# Patient Record
Sex: MALE | Race: WHITE | Employment: OTHER | URBAN - NONMETROPOLITAN AREA
[De-identification: names, ages, dates, MRNs, and addresses within clinical notes are randomized per-mention and may not be internally consistent; named-entity substitution may affect disease eponyms.]

---

## 2017-01-06 LAB — PROSTATE SPECIFIC ANTIGEN: 3.29 NG/ML

## 2017-01-09 ENCOUNTER — OFFICE VISIT (OUTPATIENT)
Dept: UROLOGY | Age: 62
End: 2017-01-09

## 2017-01-09 VITALS
WEIGHT: 185.8 LBS | BODY MASS INDEX: 28.16 KG/M2 | SYSTOLIC BLOOD PRESSURE: 112 MMHG | DIASTOLIC BLOOD PRESSURE: 72 MMHG | HEIGHT: 68 IN

## 2017-01-09 DIAGNOSIS — R97.20 ELEVATED PSA: Primary | ICD-10-CM

## 2017-01-09 DIAGNOSIS — N40.1 BENIGN NON-NODULAR PROSTATIC HYPERPLASIA WITH LOWER URINARY TRACT SYMPTOMS: ICD-10-CM

## 2017-01-09 LAB
BILIRUBIN, POC: NORMAL
BLOOD URINE, POC: NORMAL
CLARITY, POC: CLEAR
COLOR, POC: NORMAL
GLUCOSE URINE, POC: NORMAL
KETONES, POC: NORMAL
LEUKOCYTE EST, POC: NORMAL
NITRITE, POC: NORMAL
PH, POC: 6.5
PROTEIN, POC: NORMAL
SPECIFIC GRAVITY, POC: <=1.005
UROBILINOGEN, POC: NORMAL

## 2017-01-09 PROCEDURE — 99214 OFFICE O/P EST MOD 30 MIN: CPT | Performed by: UROLOGY

## 2017-01-09 PROCEDURE — 81003 URINALYSIS AUTO W/O SCOPE: CPT | Performed by: UROLOGY

## 2017-01-09 RX ORDER — MECLIZINE HYDROCHLORIDE 25 MG/1
25 TABLET ORAL 3 TIMES DAILY PRN
COMMUNITY

## 2017-01-09 RX ORDER — ACETAMINOPHEN 160 MG
2000 TABLET,DISINTEGRATING ORAL
COMMUNITY

## 2017-01-09 RX ORDER — TAMSULOSIN HYDROCHLORIDE 0.4 MG/1
CAPSULE ORAL
Qty: 90 CAPSULE | Refills: 3 | Status: SHIPPED | OUTPATIENT
Start: 2017-01-09

## 2017-01-09 ASSESSMENT — ENCOUNTER SYMPTOMS
SHORTNESS OF BREATH: 0
BACK PAIN: 0
ABDOMINAL PAIN: 0
EYE REDNESS: 0
NAUSEA: 0
CHEST TIGHTNESS: 0
EYE PAIN: 0
FACIAL SWELLING: 0

## 2021-05-26 ENCOUNTER — TELEPHONE (OUTPATIENT)
Dept: ONCOLOGY | Age: 66
End: 2021-05-26

## 2023-09-30 ENCOUNTER — APPOINTMENT (OUTPATIENT)
Dept: GENERAL RADIOLOGY | Age: 68
End: 2023-09-30
Payer: MEDICARE

## 2023-09-30 ENCOUNTER — HOSPITAL ENCOUNTER (INPATIENT)
Age: 68
LOS: 6 days | Discharge: HOME | End: 2023-10-06
Attending: FAMILY MEDICINE | Admitting: SURGERY
Payer: MEDICARE

## 2023-09-30 ENCOUNTER — ANESTHESIA (OUTPATIENT)
Dept: OPERATING ROOM | Age: 68
End: 2023-09-30
Payer: MEDICARE

## 2023-09-30 ENCOUNTER — ANESTHESIA EVENT (OUTPATIENT)
Dept: OPERATING ROOM | Age: 68
End: 2023-09-30
Payer: MEDICARE

## 2023-09-30 ENCOUNTER — APPOINTMENT (OUTPATIENT)
Dept: CT IMAGING | Age: 68
End: 2023-09-30
Payer: MEDICARE

## 2023-09-30 DIAGNOSIS — N17.9 AKI (ACUTE KIDNEY INJURY) (HCC): ICD-10-CM

## 2023-09-30 DIAGNOSIS — Z98.890 S/P EXPLORATORY LAPAROTOMY: ICD-10-CM

## 2023-09-30 DIAGNOSIS — E86.0 DEHYDRATION: ICD-10-CM

## 2023-09-30 DIAGNOSIS — K56.609 SBO (SMALL BOWEL OBSTRUCTION) (HCC): Primary | ICD-10-CM

## 2023-09-30 LAB
ALBUMIN SERPL BCG-MCNC: 4.8 G/DL (ref 3.5–5.1)
ALP SERPL-CCNC: 127 U/L (ref 38–126)
ALT SERPL W/O P-5'-P-CCNC: 9 U/L (ref 11–66)
ANION GAP SERPL CALC-SCNC: 17 MEQ/L (ref 8–16)
AST SERPL-CCNC: 23 U/L (ref 5–40)
BILIRUB SERPL-MCNC: 1.4 MG/DL (ref 0.3–1.2)
BUN SERPL-MCNC: 43 MG/DL (ref 7–22)
CALCIUM SERPL-MCNC: 10.6 MG/DL (ref 8.5–10.5)
CHLORIDE SERPL-SCNC: 87 MEQ/L (ref 98–111)
CO2 SERPL-SCNC: 35 MEQ/L (ref 23–33)
CREAT SERPL-MCNC: 1.5 MG/DL (ref 0.4–1.2)
GFR SERPL CREATININE-BSD FRML MDRD: 50 ML/MIN/1.73M2
GLUCOSE SERPL-MCNC: 144 MG/DL (ref 70–108)
LIPASE SERPL-CCNC: 22.6 U/L (ref 5.6–51.3)
OSMOLALITY SERPL CALC.SUM OF ELEC: 290.9 MOSMOL/KG (ref 275–300)
POTASSIUM SERPL-SCNC: 3.9 MEQ/L (ref 3.5–5.2)
PROT SERPL-MCNC: 7.9 G/DL (ref 6.1–8)
SCAN OF BLOOD SMEAR: NORMAL
SODIUM SERPL-SCNC: 139 MEQ/L (ref 135–145)

## 2023-09-30 PROCEDURE — 99222 1ST HOSP IP/OBS MODERATE 55: CPT | Performed by: SURGERY

## 2023-09-30 PROCEDURE — 6360000002 HC RX W HCPCS: Performed by: REGISTERED NURSE

## 2023-09-30 PROCEDURE — 74018 RADEX ABDOMEN 1 VIEW: CPT

## 2023-09-30 PROCEDURE — 6360000002 HC RX W HCPCS: Performed by: SURGERY

## 2023-09-30 PROCEDURE — 96374 THER/PROPH/DIAG INJ IV PUSH: CPT

## 2023-09-30 PROCEDURE — 44050 REDUCE BOWEL OBSTRUCTION: CPT | Performed by: SURGERY

## 2023-09-30 PROCEDURE — 7100000001 HC PACU RECOVERY - ADDTL 15 MIN: Performed by: SURGERY

## 2023-09-30 PROCEDURE — 74177 CT ABD & PELVIS W/CONTRAST: CPT

## 2023-09-30 PROCEDURE — 6360000004 HC RX CONTRAST MEDICATION: Performed by: STUDENT IN AN ORGANIZED HEALTH CARE EDUCATION/TRAINING PROGRAM

## 2023-09-30 PROCEDURE — 1200000000 HC SEMI PRIVATE

## 2023-09-30 PROCEDURE — 99285 EMERGENCY DEPT VISIT HI MDM: CPT

## 2023-09-30 PROCEDURE — 83690 ASSAY OF LIPASE: CPT

## 2023-09-30 PROCEDURE — 0DN80ZZ RELEASE SMALL INTESTINE, OPEN APPROACH: ICD-10-PCS | Performed by: SURGERY

## 2023-09-30 PROCEDURE — 6360000002 HC RX W HCPCS

## 2023-09-30 PROCEDURE — 6360000002 HC RX W HCPCS: Performed by: STUDENT IN AN ORGANIZED HEALTH CARE EDUCATION/TRAINING PROGRAM

## 2023-09-30 PROCEDURE — 0DQV0ZZ REPAIR MESENTERY, OPEN APPROACH: ICD-10-PCS | Performed by: SURGERY

## 2023-09-30 PROCEDURE — 96361 HYDRATE IV INFUSION ADD-ON: CPT

## 2023-09-30 PROCEDURE — 3600000013 HC SURGERY LEVEL 3 ADDTL 15MIN: Performed by: SURGERY

## 2023-09-30 PROCEDURE — 3700000001 HC ADD 15 MINUTES (ANESTHESIA): Performed by: SURGERY

## 2023-09-30 PROCEDURE — 2500000003 HC RX 250 WO HCPCS: Performed by: SURGERY

## 2023-09-30 PROCEDURE — 6370000000 HC RX 637 (ALT 250 FOR IP): Performed by: SURGERY

## 2023-09-30 PROCEDURE — 3600000003 HC SURGERY LEVEL 3 BASE: Performed by: SURGERY

## 2023-09-30 PROCEDURE — 2709999900 HC NON-CHARGEABLE SUPPLY: Performed by: SURGERY

## 2023-09-30 PROCEDURE — 2580000003 HC RX 258: Performed by: REGISTERED NURSE

## 2023-09-30 PROCEDURE — 0D9670Z DRAINAGE OF STOMACH WITH DRAINAGE DEVICE, VIA NATURAL OR ARTIFICIAL OPENING: ICD-10-PCS | Performed by: FAMILY MEDICINE

## 2023-09-30 PROCEDURE — 36415 COLL VENOUS BLD VENIPUNCTURE: CPT

## 2023-09-30 PROCEDURE — 85025 COMPLETE CBC W/AUTO DIFF WBC: CPT

## 2023-09-30 PROCEDURE — 3700000000 HC ANESTHESIA ATTENDED CARE: Performed by: SURGERY

## 2023-09-30 PROCEDURE — 7100000000 HC PACU RECOVERY - FIRST 15 MIN: Performed by: SURGERY

## 2023-09-30 PROCEDURE — 2500000003 HC RX 250 WO HCPCS: Performed by: REGISTERED NURSE

## 2023-09-30 PROCEDURE — 80053 COMPREHEN METABOLIC PANEL: CPT

## 2023-09-30 PROCEDURE — 6360000002 HC RX W HCPCS: Performed by: ANESTHESIOLOGY

## 2023-09-30 PROCEDURE — 2580000003 HC RX 258: Performed by: STUDENT IN AN ORGANIZED HEALTH CARE EDUCATION/TRAINING PROGRAM

## 2023-09-30 RX ORDER — LIDOCAINE HCL/PF 100 MG/5ML
SYRINGE (ML) INJECTION PRN
Status: DISCONTINUED | OUTPATIENT
Start: 2023-09-30 | End: 2023-09-30 | Stop reason: SDUPTHER

## 2023-09-30 RX ORDER — METOCLOPRAMIDE HYDROCHLORIDE 5 MG/ML
10 INJECTION INTRAMUSCULAR; INTRAVENOUS
Status: ACTIVE | OUTPATIENT
Start: 2023-09-30 | End: 2023-10-01

## 2023-09-30 RX ORDER — SODIUM CHLORIDE 9 MG/ML
INJECTION, SOLUTION INTRAVENOUS PRN
Status: DISCONTINUED | OUTPATIENT
Start: 2023-09-30 | End: 2023-10-06 | Stop reason: HOSPADM

## 2023-09-30 RX ORDER — ONDANSETRON 2 MG/ML
4 INJECTION INTRAMUSCULAR; INTRAVENOUS EVERY 6 HOURS PRN
Status: DISCONTINUED | OUTPATIENT
Start: 2023-09-30 | End: 2023-10-06 | Stop reason: HOSPADM

## 2023-09-30 RX ORDER — FENTANYL CITRATE 50 UG/ML
25 INJECTION, SOLUTION INTRAMUSCULAR; INTRAVENOUS EVERY 5 MIN PRN
Status: DISCONTINUED | OUTPATIENT
Start: 2023-09-30 | End: 2023-10-02

## 2023-09-30 RX ORDER — ACETAMINOPHEN 325 MG/1
650 TABLET ORAL EVERY 6 HOURS PRN
Status: DISCONTINUED | OUTPATIENT
Start: 2023-09-30 | End: 2023-10-06 | Stop reason: HOSPADM

## 2023-09-30 RX ORDER — SODIUM CHLORIDE 0.9 % (FLUSH) 0.9 %
5-40 SYRINGE (ML) INJECTION EVERY 12 HOURS SCHEDULED
Status: DISCONTINUED | OUTPATIENT
Start: 2023-09-30 | End: 2023-10-02

## 2023-09-30 RX ORDER — LANOLIN ALCOHOL/MO/W.PET/CERES
20 CREAM (GRAM) TOPICAL NIGHTLY
COMMUNITY

## 2023-09-30 RX ORDER — DEXTROSE MONOHYDRATE, SODIUM CHLORIDE, AND POTASSIUM CHLORIDE 50; 1.49; 4.5 G/1000ML; G/1000ML; G/1000ML
INJECTION, SOLUTION INTRAVENOUS CONTINUOUS
Status: DISCONTINUED | OUTPATIENT
Start: 2023-09-30 | End: 2023-10-06

## 2023-09-30 RX ORDER — MORPHINE SULFATE 4 MG/ML
4 INJECTION, SOLUTION INTRAMUSCULAR; INTRAVENOUS
Status: DISCONTINUED | OUTPATIENT
Start: 2023-09-30 | End: 2023-10-06 | Stop reason: HOSPADM

## 2023-09-30 RX ORDER — ROCURONIUM BROMIDE 10 MG/ML
INJECTION, SOLUTION INTRAVENOUS PRN
Status: DISCONTINUED | OUTPATIENT
Start: 2023-09-30 | End: 2023-09-30 | Stop reason: SDUPTHER

## 2023-09-30 RX ORDER — SUCCINYLCHOLINE/SOD CL,ISO/PF 200MG/10ML
SYRINGE (ML) INTRAVENOUS PRN
Status: DISCONTINUED | OUTPATIENT
Start: 2023-09-30 | End: 2023-09-30 | Stop reason: SDUPTHER

## 2023-09-30 RX ORDER — 0.9 % SODIUM CHLORIDE 0.9 %
1000 INTRAVENOUS SOLUTION INTRAVENOUS ONCE
Status: COMPLETED | OUTPATIENT
Start: 2023-09-30 | End: 2023-09-30

## 2023-09-30 RX ORDER — FENTANYL CITRATE 50 UG/ML
INJECTION, SOLUTION INTRAMUSCULAR; INTRAVENOUS PRN
Status: DISCONTINUED | OUTPATIENT
Start: 2023-09-30 | End: 2023-09-30 | Stop reason: SDUPTHER

## 2023-09-30 RX ORDER — ONDANSETRON 4 MG/1
4 TABLET, ORALLY DISINTEGRATING ORAL EVERY 8 HOURS PRN
Status: DISCONTINUED | OUTPATIENT
Start: 2023-09-30 | End: 2023-10-06 | Stop reason: HOSPADM

## 2023-09-30 RX ORDER — MIDAZOLAM HYDROCHLORIDE 1 MG/ML
1 INJECTION INTRAMUSCULAR; INTRAVENOUS ONCE
Status: DISCONTINUED | OUTPATIENT
Start: 2023-09-30 | End: 2023-10-06 | Stop reason: HOSPADM

## 2023-09-30 RX ORDER — HYDRALAZINE HYDROCHLORIDE 20 MG/ML
INJECTION INTRAMUSCULAR; INTRAVENOUS
Status: COMPLETED
Start: 2023-09-30 | End: 2023-09-30

## 2023-09-30 RX ORDER — SODIUM CHLORIDE 0.9 % (FLUSH) 0.9 %
5-40 SYRINGE (ML) INJECTION PRN
Status: DISCONTINUED | OUTPATIENT
Start: 2023-09-30 | End: 2023-10-06 | Stop reason: HOSPADM

## 2023-09-30 RX ORDER — FENTANYL CITRATE 50 UG/ML
50 INJECTION, SOLUTION INTRAMUSCULAR; INTRAVENOUS EVERY 5 MIN PRN
Status: DISCONTINUED | OUTPATIENT
Start: 2023-09-30 | End: 2023-10-02

## 2023-09-30 RX ORDER — SODIUM CHLORIDE 0.9 % (FLUSH) 0.9 %
5-40 SYRINGE (ML) INJECTION PRN
Status: DISCONTINUED | OUTPATIENT
Start: 2023-09-30 | End: 2023-10-02

## 2023-09-30 RX ORDER — PHENYLEPHRINE HCL IN 0.9% NACL 1 MG/10 ML
SYRINGE (ML) INTRAVENOUS PRN
Status: DISCONTINUED | OUTPATIENT
Start: 2023-09-30 | End: 2023-09-30 | Stop reason: SDUPTHER

## 2023-09-30 RX ORDER — MORPHINE SULFATE 2 MG/ML
2 INJECTION, SOLUTION INTRAMUSCULAR; INTRAVENOUS
Status: DISCONTINUED | OUTPATIENT
Start: 2023-09-30 | End: 2023-10-06 | Stop reason: HOSPADM

## 2023-09-30 RX ORDER — ACETAMINOPHEN 650 MG/1
650 SUPPOSITORY RECTAL EVERY 6 HOURS PRN
Status: DISCONTINUED | OUTPATIENT
Start: 2023-09-30 | End: 2023-10-06 | Stop reason: HOSPADM

## 2023-09-30 RX ORDER — CEFAZOLIN SODIUM 1 G/3ML
INJECTION, POWDER, FOR SOLUTION INTRAMUSCULAR; INTRAVENOUS PRN
Status: DISCONTINUED | OUTPATIENT
Start: 2023-09-30 | End: 2023-09-30 | Stop reason: SDUPTHER

## 2023-09-30 RX ORDER — TAMSULOSIN HYDROCHLORIDE 0.4 MG/1
0.4 CAPSULE ORAL DAILY
Status: DISCONTINUED | OUTPATIENT
Start: 2023-09-30 | End: 2023-10-06 | Stop reason: HOSPADM

## 2023-09-30 RX ORDER — CHOLECALCIFEROL (VITAMIN D3) 125 MCG
20 CAPSULE ORAL NIGHTLY
Status: DISCONTINUED | OUTPATIENT
Start: 2023-09-30 | End: 2023-09-30

## 2023-09-30 RX ORDER — ONDANSETRON 2 MG/ML
INJECTION INTRAMUSCULAR; INTRAVENOUS PRN
Status: DISCONTINUED | OUTPATIENT
Start: 2023-09-30 | End: 2023-09-30 | Stop reason: SDUPTHER

## 2023-09-30 RX ORDER — ONDANSETRON 2 MG/ML
4 INJECTION INTRAMUSCULAR; INTRAVENOUS ONCE
Status: COMPLETED | OUTPATIENT
Start: 2023-09-30 | End: 2023-09-30

## 2023-09-30 RX ORDER — PROPOFOL 10 MG/ML
INJECTION, EMULSION INTRAVENOUS PRN
Status: DISCONTINUED | OUTPATIENT
Start: 2023-09-30 | End: 2023-09-30 | Stop reason: SDUPTHER

## 2023-09-30 RX ORDER — LANOLIN ALCOHOL/MO/W.PET/CERES
18 CREAM (GRAM) TOPICAL NIGHTLY
Status: DISCONTINUED | OUTPATIENT
Start: 2023-09-30 | End: 2023-10-06 | Stop reason: HOSPADM

## 2023-09-30 RX ORDER — ENOXAPARIN SODIUM 100 MG/ML
40 INJECTION SUBCUTANEOUS EVERY 24 HOURS
Status: DISCONTINUED | OUTPATIENT
Start: 2023-09-30 | End: 2023-10-06 | Stop reason: HOSPADM

## 2023-09-30 RX ORDER — HYDRALAZINE HYDROCHLORIDE 20 MG/ML
10 INJECTION INTRAMUSCULAR; INTRAVENOUS
Status: DISCONTINUED | OUTPATIENT
Start: 2023-09-30 | End: 2023-10-06 | Stop reason: HOSPADM

## 2023-09-30 RX ORDER — SODIUM CHLORIDE 0.9 % (FLUSH) 0.9 %
5-40 SYRINGE (ML) INJECTION EVERY 12 HOURS SCHEDULED
Status: DISCONTINUED | OUTPATIENT
Start: 2023-09-30 | End: 2023-10-06 | Stop reason: HOSPADM

## 2023-09-30 RX ORDER — SODIUM CHLORIDE 9 MG/ML
INJECTION, SOLUTION INTRAVENOUS CONTINUOUS PRN
Status: DISCONTINUED | OUTPATIENT
Start: 2023-09-30 | End: 2023-09-30 | Stop reason: SDUPTHER

## 2023-09-30 RX ORDER — DIPHENHYDRAMINE HYDROCHLORIDE 50 MG/ML
12.5 INJECTION INTRAMUSCULAR; INTRAVENOUS
Status: ACTIVE | OUTPATIENT
Start: 2023-09-30 | End: 2023-10-01

## 2023-09-30 RX ORDER — MEPERIDINE HYDROCHLORIDE 25 MG/ML
12.5 INJECTION INTRAMUSCULAR; INTRAVENOUS; SUBCUTANEOUS EVERY 4 HOURS PRN
Status: DISCONTINUED | OUTPATIENT
Start: 2023-09-30 | End: 2023-10-02

## 2023-09-30 RX ORDER — DEXAMETHASONE SODIUM PHOSPHATE 4 MG/ML
INJECTION, SOLUTION INTRA-ARTICULAR; INTRALESIONAL; INTRAMUSCULAR; INTRAVENOUS; SOFT TISSUE PRN
Status: DISCONTINUED | OUTPATIENT
Start: 2023-09-30 | End: 2023-09-30 | Stop reason: SDUPTHER

## 2023-09-30 RX ADMIN — Medication 120 MG: at 14:13

## 2023-09-30 RX ADMIN — ENOXAPARIN SODIUM 40 MG: 100 INJECTION SUBCUTANEOUS at 21:20

## 2023-09-30 RX ADMIN — SODIUM CHLORIDE: 9 INJECTION, SOLUTION INTRAVENOUS at 14:08

## 2023-09-30 RX ADMIN — ROCURONIUM BROMIDE 45 MG: 10 INJECTION INTRAVENOUS at 14:20

## 2023-09-30 RX ADMIN — MORPHINE SULFATE 4 MG: 4 INJECTION, SOLUTION INTRAMUSCULAR; INTRAVENOUS at 21:03

## 2023-09-30 RX ADMIN — ONDANSETRON 4 MG: 2 INJECTION INTRAMUSCULAR; INTRAVENOUS at 11:21

## 2023-09-30 RX ADMIN — HYDRALAZINE HYDROCHLORIDE 10 MG: 20 INJECTION, SOLUTION INTRAMUSCULAR; INTRAVENOUS at 15:39

## 2023-09-30 RX ADMIN — MORPHINE SULFATE 2 MG: 2 INJECTION, SOLUTION INTRAMUSCULAR; INTRAVENOUS at 18:43

## 2023-09-30 RX ADMIN — MORPHINE SULFATE 4 MG: 4 INJECTION, SOLUTION INTRAMUSCULAR; INTRAVENOUS at 16:42

## 2023-09-30 RX ADMIN — CEFAZOLIN 2 G: 1 INJECTION, POWDER, FOR SOLUTION INTRAMUSCULAR; INTRAVENOUS at 14:10

## 2023-09-30 RX ADMIN — HYDROMORPHONE HYDROCHLORIDE 0.5 MG: 1 INJECTION, SOLUTION INTRAMUSCULAR; INTRAVENOUS; SUBCUTANEOUS at 15:54

## 2023-09-30 RX ADMIN — Medication 200 MCG: at 14:24

## 2023-09-30 RX ADMIN — POTASSIUM CHLORIDE, DEXTROSE MONOHYDRATE AND SODIUM CHLORIDE: 150; 5; 450 INJECTION, SOLUTION INTRAVENOUS at 17:08

## 2023-09-30 RX ADMIN — SODIUM CHLORIDE 1000 ML: 9 INJECTION, SOLUTION INTRAVENOUS at 11:20

## 2023-09-30 RX ADMIN — SODIUM CHLORIDE: 9 INJECTION, SOLUTION INTRAVENOUS at 14:53

## 2023-09-30 RX ADMIN — CARBIDOPA AND LEVODOPA 3 TABLET: 25; 100 TABLET ORAL at 21:18

## 2023-09-30 RX ADMIN — HYDROMORPHONE HYDROCHLORIDE 0.5 MG: 1 INJECTION, SOLUTION INTRAMUSCULAR; INTRAVENOUS; SUBCUTANEOUS at 15:49

## 2023-09-30 RX ADMIN — Medication 100 MCG: at 14:22

## 2023-09-30 RX ADMIN — HYDRALAZINE HYDROCHLORIDE 10 MG: 20 INJECTION INTRAMUSCULAR; INTRAVENOUS at 15:39

## 2023-09-30 RX ADMIN — FENTANYL CITRATE 50 MCG: 50 INJECTION, SOLUTION INTRAMUSCULAR; INTRAVENOUS at 15:41

## 2023-09-30 RX ADMIN — SUGAMMADEX 200 MG: 100 INJECTION, SOLUTION INTRAVENOUS at 15:18

## 2023-09-30 RX ADMIN — IOPAMIDOL 80 ML: 755 INJECTION, SOLUTION INTRAVENOUS at 11:37

## 2023-09-30 RX ADMIN — FENTANYL CITRATE 50 MCG: 50 INJECTION, SOLUTION INTRAMUSCULAR; INTRAVENOUS at 15:27

## 2023-09-30 RX ADMIN — PROPOFOL 150 MG: 10 INJECTION, EMULSION INTRAVENOUS at 14:13

## 2023-09-30 RX ADMIN — ONDANSETRON 4 MG: 2 INJECTION INTRAMUSCULAR; INTRAVENOUS at 14:17

## 2023-09-30 RX ADMIN — FENTANYL CITRATE 50 MCG: 50 INJECTION, SOLUTION INTRAMUSCULAR; INTRAVENOUS at 15:36

## 2023-09-30 RX ADMIN — FENTANYL CITRATE 50 MCG: 50 INJECTION, SOLUTION INTRAMUSCULAR; INTRAVENOUS at 14:29

## 2023-09-30 RX ADMIN — DEXAMETHASONE SODIUM PHOSPHATE 8 MG: 4 INJECTION, SOLUTION INTRAMUSCULAR; INTRAVENOUS at 14:17

## 2023-09-30 RX ADMIN — Medication 60 MG: at 14:13

## 2023-09-30 RX ADMIN — ROCURONIUM BROMIDE 5 MG: 10 INJECTION INTRAVENOUS at 14:13

## 2023-09-30 ASSESSMENT — PAIN SCALES - GENERAL
PAINLEVEL_OUTOF10: 7
PAINLEVEL_OUTOF10: 8
PAINLEVEL_OUTOF10: 8
PAINLEVEL_OUTOF10: 6
PAINLEVEL_OUTOF10: 7
PAINLEVEL_OUTOF10: 7
PAINLEVEL_OUTOF10: 8
PAINLEVEL_OUTOF10: 8
PAINLEVEL_OUTOF10: 10
PAINLEVEL_OUTOF10: 10

## 2023-09-30 ASSESSMENT — ENCOUNTER SYMPTOMS
NAUSEA: 1
VOMITING: 1
ABDOMINAL DISTENTION: 1
ABDOMINAL PAIN: 1
DIARRHEA: 0
EYES NEGATIVE: 1
ALLERGIC/IMMUNOLOGIC NEGATIVE: 1
CONSTIPATION: 1
RESPIRATORY NEGATIVE: 1

## 2023-09-30 ASSESSMENT — PAIN - FUNCTIONAL ASSESSMENT
PAIN_FUNCTIONAL_ASSESSMENT: NONE - DENIES PAIN
PAIN_FUNCTIONAL_ASSESSMENT: NONE - DENIES PAIN
PAIN_FUNCTIONAL_ASSESSMENT: ACTIVITIES ARE NOT PREVENTED
PAIN_FUNCTIONAL_ASSESSMENT: 0-10

## 2023-09-30 ASSESSMENT — PAIN DESCRIPTION - DESCRIPTORS
DESCRIPTORS: SHARP
DESCRIPTORS: ACHING
DESCRIPTORS: SHARP
DESCRIPTORS: SORE

## 2023-09-30 ASSESSMENT — PAIN DESCRIPTION - LOCATION
LOCATION: ABDOMEN

## 2023-09-30 ASSESSMENT — PAIN DESCRIPTION - ONSET
ONSET: ON-GOING
ONSET: ON-GOING

## 2023-09-30 ASSESSMENT — PAIN DESCRIPTION - PAIN TYPE
TYPE: SURGICAL PAIN
TYPE: SURGICAL PAIN

## 2023-09-30 ASSESSMENT — PAIN DESCRIPTION - ORIENTATION: ORIENTATION: MID

## 2023-09-30 ASSESSMENT — PAIN DESCRIPTION - FREQUENCY
FREQUENCY: CONTINUOUS
FREQUENCY: CONTINUOUS

## 2023-09-30 NOTE — ANESTHESIA PRE PROCEDURE
results for input(s): \"POCGLU\", \"POCNA\", \"POCK\", \"POCCL\", \"POCBUN\", \"POCHEMO\", \"POCHCT\" in the last 72 hours. Coags: No results found for: \"PROTIME\", \"INR\", \"APTT\"    HCG (If Applicable): No results found for: \"PREGTESTUR\", \"PREGSERUM\", \"HCG\", \"HCGQUANT\"     ABGs: No results found for: \"PHART\", \"PO2ART\", \"WMN8KTO\", \"LYV4QGJ\", \"BEART\", \"M9CIIZPK\"     Type & Screen (If Applicable):  No results found for: \"LABABO\", \"LABRH\"    Drug/Infectious Status (If Applicable):  No results found for: \"HIV\", \"HEPCAB\"    COVID-19 Screening (If Applicable): No results found for: \"COVID19\"        Anesthesia Evaluation  Patient summary reviewed no history of anesthetic complications:   Airway: Mallampati: I  TM distance: >3 FB   Neck ROM: full  Mouth opening: > = 3 FB   Dental: normal exam         Pulmonary:normal exam                               Cardiovascular:                      Neuro/Psych:               GI/Hepatic/Renal:            ROS comment: SBO. Endo/Other:                     Abdominal:             Vascular: Other Findings:           Anesthesia Plan      general     ASA 2 - emergent     (NGT in place, will put to suction prior to induction)  Induction: intravenous and rapid sequence. MIPS: Postoperative opioids intended and Prophylactic antiemetics administered. Anesthetic plan and risks discussed with patient and spouse.       Plan discussed with CRNA and surgical team.                    Mayco He MD   9/30/2023
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
## 2023-09-30 NOTE — ED TRIAGE NOTES
Pt to ED for emesis and constipation. Pt states he has not had a normal BM for 1 week. Pt has been seen at [place] for this. Was given and enema with minimal relief. Pt has been taking laxatives at home. Pt still cannot keep food down.

## 2023-09-30 NOTE — ANESTHESIA POSTPROCEDURE EVALUATION
Department of Anesthesiology  Postprocedure Note    Patient: Natividad Lopez  MRN: [id number]  YOB: 1955  Date of evaluation: 9/30/2023      Procedure Summary     Date: 09/30/23 Room / Location: [facility] / [facility]    Anesthesia Start: 1408 Anesthesia Stop: 7836    Procedure: LAPAROTOMY EXPLORATORY. RELEASE OF ADHESIONS AND REDUCTION OF INTERNAL HERNIA Diagnosis: S/P small bowel resection    Surgeons: Gerardo Schaffer MD Responsible Provider: Gita Quevedo MD    Anesthesia Type: general ASA Status: 2 - Emergent          Anesthesia Type: No value filed.     Lashawn Phase I: Lashawn Score: 8    Lashawn Phase II:        Anesthesia Post Evaluation    Patient location during evaluation: PACU  Patient participation: complete - patient participated  Level of consciousness: awake and alert  Airway patency: patent  Nausea & Vomiting: no nausea and no vomiting  Complications: no  Cardiovascular status: hemodynamically stable  Respiratory status: acceptable  Hydration status: euvolemic  Pain management: adequate

## 2023-09-30 NOTE — ED PROVIDER NOTES
Refill: Capillary refill takes less than 2 seconds. Neurological:      General: No focal deficit present. Mental Status: He is alert and oriented to person, place, and time. Psychiatric:         Mood and Affect: Mood normal.         Behavior: Behavior normal.           DIFFERENTIALS   Initial Assessment: Given the patient's above chief complaint and findings on history and physical examination, I thought it was appropriate to consider the following emergency medical conditions: Ileus, constipation, small bowel obstruction, large bowel obstruction, gastroenteritis, dehydration, electrolyte derangement, ROSALIA although some of these diagnoses are unlikely they were considered in my medical decision making.       Chronic Conditions considered:   Patient Active Problem List   Diagnosis    Elevated PSA    Small bowel obstruction [facility])         ED RESULTS   Laboratory results:  Labs Reviewed   CBC WITH AUTO DIFFERENTIAL - Abnormal; Notable for the following components:       Result Value    WBC 17.1 (*)     RBC 6.19 (*)     Hemoglobin 19.4 (*)     Hematocrit 57.7 (*)     RDW-CV 14.6 (*)     RDW-SD 48.5 (*)     All other components within normal limits   COMPREHENSIVE METABOLIC PANEL W/ REFLEX TO MG FOR LOW K - Abnormal; Notable for the following components:    Glucose 144 (*)     Creatinine 1.5 (*)     BUN 43 (*)     Chloride 87 (*)     CO2 35 (*)     Calcium 10.6 (*)     Alkaline Phosphatase 127 (*)     Total Bilirubin 1.4 (*)     ALT 9 (*)     All other components within normal limits   ANION GAP - Abnormal; Notable for the following components:    Anion Gap 17.0 (*)     All other components within normal limits   GLOMERULAR FILTRATION RATE, ESTIMATED - Abnormal; Notable for the following components:    Est, Glom Filt Rate 50 (*)     All other components within normal limits   LIPASE   OSMOLALITY   SCAN OF BLOOD SMEAR       Radiologic studies results:  CT ABDOMEN PELVIS W IV CONTRAST Additional Contrast? None and plan of care were discussed with the patient and present family who expressed understanding. Any medications were reviewed and indications and risks of medications were discussed with the patient /family present. Strict verbal and written return precautions, instructions and appropriate follow-up provided to  the patient    Critical Care Time   CRITICAL CARE:  None    PROCEDURES: (None if blank)  Procedures:   Medical Decision Making  Problems Addressed:  ROSALIA (acute kidney injury) ([address]): acute illness or injury  Dehydration: acute illness or injury  SBO (small bowel obstruction) ([address]): acute illness or injury        This transcription was electronically signed. Parts of this transcriptions may have been dictated by use of voice recognition software and electronically transcribed, and parts may have been transcribed with the assistance of an ED scribe. The transcription may contain errors not detected in proofreading.     Electronically Signed: Slava Merrill MD, 09/30/23, 1:03 PM          Slava Merrill MD  Resident  09/30/23 5213

## 2023-09-30 NOTE — PROGRESS NOTES
1524: pt arrives to pacu. Pt on room air and placed on 4L  nasal cannula. Pt responds to verbal stimulation. VSS. Respirations unlabored. Ng in left nare. X1 incision on abdomen. Haynes in place.  CRNA gave fentanyl at bedside   1536: pt given 50mcg of fentanyl  1539: pt given 10mg of hydralazine   1541: pt given 50mcg of fentanyl   1547: attempt to call report to 5K, RN states she will call me back   [phone]: called evs to check on status of pt's room, states they have been cleaning for 40 min   1549: pt given 0.5mg of dilaudid   1554: pt given 0.5mg of dilaudid   1607: report called to [address]   1334: pt meets discharge criteria from pacu  1618: pt transported to [facility]

## 2023-10-01 ENCOUNTER — APPOINTMENT (OUTPATIENT)
Dept: GENERAL RADIOLOGY | Age: 68
End: 2023-10-01
Payer: MEDICARE

## 2023-10-01 PROBLEM — Z98.890 S/P EXPLORATORY LAPAROTOMY: Status: ACTIVE | Noted: 2023-10-01

## 2023-10-01 LAB
ANION GAP SERPL CALC-SCNC: 9 MEQ/L (ref 8–16)
BASOPHILS ABSOLUTE: 0 THOU/MM3 (ref 0–0.1)
BASOPHILS ABSOLUTE: 0 THOU/MM3 (ref 0–0.1)
BASOPHILS NFR BLD AUTO: 0.2 %
BASOPHILS NFR BLD AUTO: 0.3 %
BUN SERPL-MCNC: 32 MG/DL (ref 7–22)
CALCIUM SERPL-MCNC: 8.4 MG/DL (ref 8.5–10.5)
CHLORIDE SERPL-SCNC: 102 MEQ/L (ref 98–111)
CO2 SERPL-SCNC: 27 MEQ/L (ref 23–33)
CREAT SERPL-MCNC: 1 MG/DL (ref 0.4–1.2)
DEPRECATED RDW RBC AUTO: 48.5 FL (ref 35–45)
DEPRECATED RDW RBC AUTO: 49.8 FL (ref 35–45)
EOSINOPHIL NFR BLD AUTO: 0 %
EOSINOPHIL NFR BLD AUTO: 0.2 %
EOSINOPHILS ABSOLUTE: 0 THOU/MM3 (ref 0–0.4)
EOSINOPHILS ABSOLUTE: 0 THOU/MM3 (ref 0–0.4)
ERYTHROCYTE [DISTWIDTH] IN BLOOD BY AUTOMATED COUNT: 14.4 % (ref 11.5–14.5)
ERYTHROCYTE [DISTWIDTH] IN BLOOD BY AUTOMATED COUNT: 14.6 % (ref 11.5–14.5)
GFR SERPL CREATININE-BSD FRML MDRD: > 60 ML/MIN/1.73M2
GLUCOSE SERPL-MCNC: 133 MG/DL (ref 70–108)
HCT VFR BLD AUTO: 50.8 % (ref 42–52)
HCT VFR BLD AUTO: 57.7 % (ref 42–52)
HGB BLD-MCNC: 16.7 GM/DL (ref 14–18)
HGB BLD-MCNC: 19.4 GM/DL (ref 14–18)
IMM GRANULOCYTES # BLD AUTO: 0.03 THOU/MM3 (ref 0–0.07)
IMM GRANULOCYTES # BLD AUTO: 0.07 THOU/MM3 (ref 0–0.07)
IMM GRANULOCYTES NFR BLD AUTO: 0.3 %
IMM GRANULOCYTES NFR BLD AUTO: 0.4 %
LYMPHOCYTES ABSOLUTE: 0.5 THOU/MM3 (ref 1–4.8)
LYMPHOCYTES ABSOLUTE: 0.7 THOU/MM3 (ref 1–4.8)
LYMPHOCYTES NFR BLD AUTO: 4 %
LYMPHOCYTES NFR BLD AUTO: 4.2 %
MCH RBC QN AUTO: 30.9 PG (ref 26–33)
MCH RBC QN AUTO: 31.3 PG (ref 26–33)
MCHC RBC AUTO-ENTMCNC: 32.9 GM/DL (ref 32.2–35.5)
MCHC RBC AUTO-ENTMCNC: 33.6 GM/DL (ref 32.2–35.5)
MCV RBC AUTO: 93.2 FL (ref 80–94)
MCV RBC AUTO: 93.9 FL (ref 80–94)
MONOCYTES ABSOLUTE: 2.4 THOU/MM3 (ref 0.4–1.3)
MONOCYTES ABSOLUTE: 2.5 THOU/MM3 (ref 0.4–1.3)
MONOCYTES NFR BLD AUTO: 13.9 %
MONOCYTES NFR BLD AUTO: 21.6 %
NEUTROPHILS NFR BLD AUTO: 73.4 %
NEUTROPHILS NFR BLD AUTO: 81.5 %
NRBC BLD AUTO-RTO: 0 /100 WBC
NRBC BLD AUTO-RTO: 0 /100 WBC
PATHOLOGIST REVIEW: ABNORMAL
PLATELET # BLD AUTO: 232 THOU/MM3 (ref 130–400)
PLATELET # BLD AUTO: 282 THOU/MM3 (ref 130–400)
PMV BLD AUTO: 10.9 FL (ref 9.4–12.4)
PMV BLD AUTO: 11.2 FL (ref 9.4–12.4)
POTASSIUM SERPL-SCNC: 4.1 MEQ/L (ref 3.5–5.2)
RBC # BLD AUTO: 5.41 MILL/MM3 (ref 4.7–6.1)
RBC # BLD AUTO: 6.19 MILL/MM3 (ref 4.7–6.1)
SCAN OF BLOOD SMEAR: NORMAL
SEGMENTED NEUTROPHILS ABSOLUTE COUNT: 13.9 THOU/MM3 (ref 1.8–7.7)
SEGMENTED NEUTROPHILS ABSOLUTE COUNT: 8.6 THOU/MM3 (ref 1.8–7.7)
SODIUM SERPL-SCNC: 138 MEQ/L (ref 135–145)
WBC # BLD AUTO: 11.7 THOU/MM3 (ref 4.8–10.8)
WBC # BLD AUTO: 17.1 THOU/MM3 (ref 4.8–10.8)

## 2023-10-01 PROCEDURE — C9113 INJ PANTOPRAZOLE SODIUM, VIA: HCPCS | Performed by: NURSE PRACTITIONER

## 2023-10-01 PROCEDURE — 36415 COLL VENOUS BLD VENIPUNCTURE: CPT

## 2023-10-01 PROCEDURE — 99024 POSTOP FOLLOW-UP VISIT: CPT | Performed by: NURSE PRACTITIONER

## 2023-10-01 PROCEDURE — 85025 COMPLETE CBC W/AUTO DIFF WBC: CPT

## 2023-10-01 PROCEDURE — 1200000000 HC SEMI PRIVATE

## 2023-10-01 PROCEDURE — 2500000003 HC RX 250 WO HCPCS: Performed by: SURGERY

## 2023-10-01 PROCEDURE — 6370000000 HC RX 637 (ALT 250 FOR IP): Performed by: SURGERY

## 2023-10-01 PROCEDURE — 2580000003 HC RX 258: Performed by: ANESTHESIOLOGY

## 2023-10-01 PROCEDURE — APPSS30 APP SPLIT SHARED TIME 16-30 MINUTES: Performed by: NURSE PRACTITIONER

## 2023-10-01 PROCEDURE — 6360000002 HC RX W HCPCS: Performed by: SURGERY

## 2023-10-01 PROCEDURE — 6360000002 HC RX W HCPCS: Performed by: NURSE PRACTITIONER

## 2023-10-01 PROCEDURE — 80048 BASIC METABOLIC PNL TOTAL CA: CPT

## 2023-10-01 PROCEDURE — 74018 RADEX ABDOMEN 1 VIEW: CPT

## 2023-10-01 RX ORDER — PANTOPRAZOLE SODIUM 40 MG/10ML
40 INJECTION, POWDER, LYOPHILIZED, FOR SOLUTION INTRAVENOUS DAILY
Status: DISCONTINUED | OUTPATIENT
Start: 2023-10-01 | End: 2023-10-06 | Stop reason: HOSPADM

## 2023-10-01 RX ADMIN — CARBIDOPA AND LEVODOPA 3 TABLET: 25; 100 TABLET ORAL at 14:28

## 2023-10-01 RX ADMIN — TAMSULOSIN HYDROCHLORIDE 0.4 MG: 0.4 CAPSULE ORAL at 07:49

## 2023-10-01 RX ADMIN — PANTOPRAZOLE SODIUM 40 MG: 40 INJECTION, POWDER, FOR SOLUTION INTRAVENOUS at 14:27

## 2023-10-01 RX ADMIN — MORPHINE SULFATE 2 MG: 2 INJECTION, SOLUTION INTRAMUSCULAR; INTRAVENOUS at 10:25

## 2023-10-01 RX ADMIN — MORPHINE SULFATE 4 MG: 4 INJECTION, SOLUTION INTRAMUSCULAR; INTRAVENOUS at 14:36

## 2023-10-01 RX ADMIN — CARBIDOPA AND LEVODOPA 3 TABLET: 25; 100 TABLET ORAL at 07:49

## 2023-10-01 RX ADMIN — POTASSIUM CHLORIDE, DEXTROSE MONOHYDRATE AND SODIUM CHLORIDE: 150; 5; 450 INJECTION, SOLUTION INTRAVENOUS at 06:42

## 2023-10-01 RX ADMIN — MORPHINE SULFATE 4 MG: 4 INJECTION, SOLUTION INTRAMUSCULAR; INTRAVENOUS at 04:04

## 2023-10-01 RX ADMIN — MORPHINE SULFATE 2 MG: 2 INJECTION, SOLUTION INTRAMUSCULAR; INTRAVENOUS at 18:58

## 2023-10-01 RX ADMIN — SODIUM CHLORIDE, PRESERVATIVE FREE 10 ML: 5 INJECTION INTRAVENOUS at 07:49

## 2023-10-01 RX ADMIN — CARBIDOPA AND LEVODOPA 3 TABLET: 25; 100 TABLET ORAL at 21:00

## 2023-10-01 RX ADMIN — POTASSIUM CHLORIDE, DEXTROSE MONOHYDRATE AND SODIUM CHLORIDE: 150; 5; 450 INJECTION, SOLUTION INTRAVENOUS at 22:56

## 2023-10-01 RX ADMIN — ENOXAPARIN SODIUM 40 MG: 100 INJECTION SUBCUTANEOUS at 21:00

## 2023-10-01 RX ADMIN — MORPHINE SULFATE 4 MG: 4 INJECTION, SOLUTION INTRAMUSCULAR; INTRAVENOUS at 00:10

## 2023-10-01 RX ADMIN — MORPHINE SULFATE 2 MG: 2 INJECTION, SOLUTION INTRAMUSCULAR; INTRAVENOUS at 22:58

## 2023-10-01 ASSESSMENT — PAIN DESCRIPTION - ORIENTATION
ORIENTATION: MID

## 2023-10-01 ASSESSMENT — PAIN DESCRIPTION - LOCATION
LOCATION: ABDOMEN

## 2023-10-01 ASSESSMENT — PAIN SCALES - GENERAL
PAINLEVEL_OUTOF10: 4
PAINLEVEL_OUTOF10: 5
PAINLEVEL_OUTOF10: 5
PAINLEVEL_OUTOF10: 7
PAINLEVEL_OUTOF10: 6
PAINLEVEL_OUTOF10: 7
PAINLEVEL_OUTOF10: 6
PAINLEVEL_OUTOF10: 7
PAINLEVEL_OUTOF10: 0

## 2023-10-01 ASSESSMENT — PAIN DESCRIPTION - PAIN TYPE
TYPE: SURGICAL PAIN
TYPE: SURGICAL PAIN

## 2023-10-01 ASSESSMENT — PAIN DESCRIPTION - FREQUENCY
FREQUENCY: INTERMITTENT
FREQUENCY: INTERMITTENT

## 2023-10-01 ASSESSMENT — PAIN DESCRIPTION - DESCRIPTORS
DESCRIPTORS: DISCOMFORT
DESCRIPTORS: SHARP
DESCRIPTORS: SHARP
DESCRIPTORS: ACHING
DESCRIPTORS: SHARP

## 2023-10-01 ASSESSMENT — PAIN DESCRIPTION - ONSET
ONSET: SUDDEN
ONSET: ON-GOING

## 2023-10-01 ASSESSMENT — PAIN - FUNCTIONAL ASSESSMENT
PAIN_FUNCTIONAL_ASSESSMENT: ACTIVITIES ARE NOT PREVENTED
PAIN_FUNCTIONAL_ASSESSMENT: PREVENTS OR INTERFERES SOME ACTIVE ACTIVITIES AND ADLS

## 2023-10-01 NOTE — PLAN OF CARE
Problem: Pain  Goal: Verbalizes/displays adequate comfort level or baseline comfort level  10/1/2023 0325 by Charisse De La Rosa RN  Outcome: Progressing     Problem: Safety - Adult  Goal: Free from fall injury  10/1/2023 0325 by Charisse De La Rosa RN  Outcome: Progressing     Problem: ABCDS Injury Assessment  Goal: Absence of physical injury  10/1/2023 0325 by Charisse De La Rosa RN  Outcome: Progressing     Problem: Skin/Tissue Integrity  Goal: Absence of new skin breakdown  Description: 1. Monitor for areas of redness and/or skin breakdown  2. Assess vascular access sites hourly  3. Every 4-6 hours minimum:  Change oxygen saturation probe site  4. Every 4-6 hours:  If on nasal continuous positive airway pressure, respiratory therapy assess nares and determine need for appliance change or resting period. Outcome: Progressing     Problem: Discharge Planning  Goal: Discharge to home or other facility with appropriate resources  Outcome: Progressing     Problem: Gastrointestinal - Adult  Goal: Minimal or absence of nausea and vomiting  Outcome: Progressing     Problem: Gastrointestinal - Adult  Goal: Maintains or returns to baseline bowel function  Outcome: Progressing     Problem: Genitourinary - Adult  Goal: Absence of urinary retention  Outcome: Progressing     Problem: Genitourinary - Adult  Goal: Urinary catheter remains patent  Outcome: Progressing     Problem: Infection - Adult  Goal: Absence of infection at discharge  Outcome: Progressing     Problem: Infection - Adult  Goal: Absence of infection during hospitalization  Outcome: Progressing   Patient educated on use of daily CHG bath to prevent surgical site infection, s/s of infection, use of incentive spirometer and early ambulation. Patient verbalized understanding. Care plan reviewed with patient. Patient verbalizes understanding of the plan of care and contributes to goal setting.

## 2023-10-01 NOTE — PROGRESS NOTES
Attempted to wean patient off of 1L and oxygen sat dropped to 88% while on RA. Placed back on 1L nasal cannula. Patient educated on IS use. Instructed to use 10x an hour while awake. Patient demonstrates understanding. Goal is 1500 and patient is achieving. Patient instructed to use CHG soap on midline incision daily. ABD cleansed with CHG this am. Incision is MARGE and free from any S/S of infection. Patient educated on importance of early ambulation - patient has ambulated around room this morning. RN informed patient his catheter could come out this morning per protocol. Patient expressed worries with the catheter coming out due to a history of post operative urinary retention. RN stated we will speak to the attending before removing the wesley catheter.

## 2023-10-02 ENCOUNTER — APPOINTMENT (OUTPATIENT)
Dept: GENERAL RADIOLOGY | Age: 68
End: 2023-10-02
Payer: MEDICARE

## 2023-10-02 LAB
DEPRECATED RDW RBC AUTO: 49.7 FL (ref 35–45)
ERYTHROCYTE [DISTWIDTH] IN BLOOD BY AUTOMATED COUNT: 14.1 % (ref 11.5–14.5)
HCT VFR BLD AUTO: 47.6 % (ref 42–52)
HGB BLD-MCNC: 15.3 GM/DL (ref 14–18)
MCH RBC QN AUTO: 30.6 PG (ref 26–33)
MCHC RBC AUTO-ENTMCNC: 32.1 GM/DL (ref 32.2–35.5)
MCV RBC AUTO: 95.2 FL (ref 80–94)
PLATELET # BLD AUTO: 227 THOU/MM3 (ref 130–400)
PMV BLD AUTO: 11.3 FL (ref 9.4–12.4)
RBC # BLD AUTO: 5 MILL/MM3 (ref 4.7–6.1)
WBC # BLD AUTO: 7.6 THOU/MM3 (ref 4.8–10.8)

## 2023-10-02 PROCEDURE — 2500000003 HC RX 250 WO HCPCS: Performed by: SURGERY

## 2023-10-02 PROCEDURE — 85027 COMPLETE CBC AUTOMATED: CPT

## 2023-10-02 PROCEDURE — APPSS30 APP SPLIT SHARED TIME 16-30 MINUTES: Performed by: NURSE PRACTITIONER

## 2023-10-02 PROCEDURE — 6360000002 HC RX W HCPCS: Performed by: SURGERY

## 2023-10-02 PROCEDURE — 6360000004 HC RX CONTRAST MEDICATION: Performed by: NURSE PRACTITIONER

## 2023-10-02 PROCEDURE — 36415 COLL VENOUS BLD VENIPUNCTURE: CPT

## 2023-10-02 PROCEDURE — 6360000002 HC RX W HCPCS: Performed by: NURSE PRACTITIONER

## 2023-10-02 PROCEDURE — 99024 POSTOP FOLLOW-UP VISIT: CPT | Performed by: NURSE PRACTITIONER

## 2023-10-02 PROCEDURE — 74018 RADEX ABDOMEN 1 VIEW: CPT

## 2023-10-02 PROCEDURE — 74250 X-RAY XM SM INT 1CNTRST STD: CPT

## 2023-10-02 PROCEDURE — C9113 INJ PANTOPRAZOLE SODIUM, VIA: HCPCS | Performed by: NURSE PRACTITIONER

## 2023-10-02 PROCEDURE — 2580000003 HC RX 258: Performed by: ANESTHESIOLOGY

## 2023-10-02 PROCEDURE — 1200000000 HC SEMI PRIVATE

## 2023-10-02 PROCEDURE — 6370000000 HC RX 637 (ALT 250 FOR IP): Performed by: SURGERY

## 2023-10-02 RX ORDER — CHLORPROMAZINE HYDROCHLORIDE 25 MG/ML
25 INJECTION INTRAMUSCULAR ONCE
Status: COMPLETED | OUTPATIENT
Start: 2023-10-02 | End: 2023-10-02

## 2023-10-02 RX ADMIN — MORPHINE SULFATE 2 MG: 2 INJECTION, SOLUTION INTRAMUSCULAR; INTRAVENOUS at 23:10

## 2023-10-02 RX ADMIN — SODIUM CHLORIDE, PRESERVATIVE FREE 10 ML: 5 INJECTION INTRAVENOUS at 08:25

## 2023-10-02 RX ADMIN — TAMSULOSIN HYDROCHLORIDE 0.4 MG: 0.4 CAPSULE ORAL at 08:25

## 2023-10-02 RX ADMIN — POTASSIUM CHLORIDE, DEXTROSE MONOHYDRATE AND SODIUM CHLORIDE: 150; 5; 450 INJECTION, SOLUTION INTRAVENOUS at 17:00

## 2023-10-02 RX ADMIN — Medication 18 MG: at 22:02

## 2023-10-02 RX ADMIN — CARBIDOPA AND LEVODOPA 3 TABLET: 25; 100 TABLET ORAL at 22:02

## 2023-10-02 RX ADMIN — PANTOPRAZOLE SODIUM 40 MG: 40 INJECTION, POWDER, FOR SOLUTION INTRAVENOUS at 08:25

## 2023-10-02 RX ADMIN — CHLORPROMAZINE HYDROCHLORIDE 25 MG: 25 INJECTION INTRAMUSCULAR at 12:09

## 2023-10-02 RX ADMIN — DIATRIZOATE MEGLUMINE AND DIATRIZOATE SODIUM 90 ML: 660; 100 LIQUID ORAL; RECTAL at 10:26

## 2023-10-02 RX ADMIN — CARBIDOPA AND LEVODOPA 3 TABLET: 25; 100 TABLET ORAL at 14:57

## 2023-10-02 RX ADMIN — ENOXAPARIN SODIUM 40 MG: 100 INJECTION SUBCUTANEOUS at 22:02

## 2023-10-02 RX ADMIN — CARBIDOPA AND LEVODOPA 3 TABLET: 25; 100 TABLET ORAL at 08:25

## 2023-10-02 ASSESSMENT — PAIN DESCRIPTION - PAIN TYPE: TYPE: SURGICAL PAIN

## 2023-10-02 ASSESSMENT — PAIN SCALES - GENERAL
PAINLEVEL_OUTOF10: 0
PAINLEVEL_OUTOF10: 6

## 2023-10-02 ASSESSMENT — PAIN DESCRIPTION - LOCATION: LOCATION: ABDOMEN;INCISION

## 2023-10-02 ASSESSMENT — PAIN DESCRIPTION - ORIENTATION: ORIENTATION: MID;LOWER

## 2023-10-02 ASSESSMENT — PAIN DESCRIPTION - DESCRIPTORS: DESCRIPTORS: ACHING

## 2023-10-02 NOTE — PLAN OF CARE
Problem: Pain  Goal: Verbalizes/displays adequate comfort level or baseline comfort level  Outcome: Progressing     Problem: Safety - Adult  Goal: Free from fall injury  Outcome: Progressing     Problem: ABCDS Injury Assessment  Goal: Absence of physical injury  Outcome: Progressing     Problem: Skin/Tissue Integrity  Goal: Absence of new skin breakdown  Description: 1. Monitor for areas of redness and/or skin breakdown  2. Assess vascular access sites hourly  3. Every 4-6 hours minimum:  Change oxygen saturation probe site  4. Every 4-6 hours:  If on nasal continuous positive airway pressure, respiratory therapy assess nares and determine need for appliance change or resting period. Outcome: Progressing     Problem: Discharge Planning  Goal: Discharge to home or other facility with appropriate resources  Outcome: Progressing     Problem: Gastrointestinal - Adult  Goal: Minimal or absence of nausea and vomiting  Outcome: Progressing  Goal: Maintains or returns to baseline bowel function  Outcome: Progressing     Problem: Genitourinary - Adult  Goal: Absence of urinary retention  Outcome: Progressing  Goal: Urinary catheter remains patent  Outcome: Progressing     Problem: Infection - Adult  Goal: Absence of infection at discharge  Outcome: Progressing  Goal: Absence of infection during hospitalization  Outcome: Progressing   Patient educated on use of daily CHG bath to prevent surgical site infection, s/s of infection, use of incentive spirometer and early ambulation. Patient verbalized understanding. Care plan reviewed with patient. Patient verbalizes understanding of the plan of care and contributes to goal setting.

## 2023-10-02 NOTE — CARE COORDINATION
Case Management Assessment  Initial Evaluation    Date/Time of Evaluation: 10/2/2023 1:47 PM  Assessment Completed by: Renzo Davidson RN    If patient is discharged prior to next notation, then this note serves as note for discharge by case management. Patient Name: Shivam Loo                   YOB: 1955  Diagnosis: Dehydration [E86.0]  Small bowel obstruction ([address]) [I72.544]  SBO (small bowel obstruction) ([address]) [K56.609]  ROSALIA (acute kidney injury) ([address]) [N17.9]                   Date / Time: 9/30/2023 10:39 AM  Location: [facility]/017     Patient Admission Status: Inpatient   Readmission Risk Low 0-14, Mod 15-19), High > 20: Readmission Risk Score: 12.2    Current PCP: Joshua MAYER  PCP verified by CM? Yes    Chart Reviewed: Yes      History Provided by: Significant Other  Patient Orientation: Alert and Oriented    Patient Cognition: Alert    Hospitalization in the last 30 days (Readmission):  No    If yes, Readmission Assessment in CM Navigator will be completed. Advance Directives:      Code Status: Full Code   Patient's Primary Decision Maker is: Named in [facility] ([facility] docs on hand, not yet scanned in;  notified. Wife Holyl Gonzalez, secondary decision maker son cipriano.)      Discharge Planning:    Patient lives with: Spouse/Significant Other Type of Home: House  Primary Care Giver: Self  Patient Support Systems include: Spouse/Significant Other, Children   Current Financial resources: Medicare  Current community resources: None  Current services prior to admission: Durable Medical Equipment            Current DME: Cane            Type of Home Care services:  None    ADLS  Prior functional level: Independent in ADLs/IADLs  Current functional level: Other (see comment) (therapy to eval)    Family can provide assistance at DC: Yes  Would you like Case Management to discuss the discharge plan with any other family members/significant others, and if so, who?  Yes (Luis Waddell)  Plans

## 2023-10-03 ENCOUNTER — APPOINTMENT (OUTPATIENT)
Dept: GENERAL RADIOLOGY | Age: 68
End: 2023-10-03
Payer: MEDICARE

## 2023-10-03 PROCEDURE — 6370000000 HC RX 637 (ALT 250 FOR IP): Performed by: SURGERY

## 2023-10-03 PROCEDURE — 1200000000 HC SEMI PRIVATE

## 2023-10-03 PROCEDURE — APPSS30 APP SPLIT SHARED TIME 16-30 MINUTES: Performed by: NURSE PRACTITIONER

## 2023-10-03 PROCEDURE — 6370000000 HC RX 637 (ALT 250 FOR IP): Performed by: NURSE PRACTITIONER

## 2023-10-03 PROCEDURE — 6360000002 HC RX W HCPCS: Performed by: SURGERY

## 2023-10-03 PROCEDURE — 2580000003 HC RX 258: Performed by: SURGERY

## 2023-10-03 PROCEDURE — 6360000002 HC RX W HCPCS: Performed by: NURSE PRACTITIONER

## 2023-10-03 PROCEDURE — 2500000003 HC RX 250 WO HCPCS: Performed by: SURGERY

## 2023-10-03 PROCEDURE — 99024 POSTOP FOLLOW-UP VISIT: CPT | Performed by: NURSE PRACTITIONER

## 2023-10-03 PROCEDURE — 74018 RADEX ABDOMEN 1 VIEW: CPT

## 2023-10-03 PROCEDURE — C9113 INJ PANTOPRAZOLE SODIUM, VIA: HCPCS | Performed by: NURSE PRACTITIONER

## 2023-10-03 RX ADMIN — CARBIDOPA AND LEVODOPA 3 TABLET: 25; 100 TABLET ORAL at 21:27

## 2023-10-03 RX ADMIN — CARBIDOPA AND LEVODOPA 3 TABLET: 25; 100 TABLET ORAL at 14:46

## 2023-10-03 RX ADMIN — TAMSULOSIN HYDROCHLORIDE 0.4 MG: 0.4 CAPSULE ORAL at 08:09

## 2023-10-03 RX ADMIN — NALOXEGOL OXALATE 12.5 MG: 12.5 TABLET, FILM COATED ORAL at 08:08

## 2023-10-03 RX ADMIN — SODIUM CHLORIDE, PRESERVATIVE FREE 10 ML: 5 INJECTION INTRAVENOUS at 08:08

## 2023-10-03 RX ADMIN — POTASSIUM CHLORIDE, DEXTROSE MONOHYDRATE AND SODIUM CHLORIDE: 150; 5; 450 INJECTION, SOLUTION INTRAVENOUS at 21:40

## 2023-10-03 RX ADMIN — ENOXAPARIN SODIUM 40 MG: 100 INJECTION SUBCUTANEOUS at 21:27

## 2023-10-03 RX ADMIN — ACETAMINOPHEN 650 MG: 325 TABLET ORAL at 10:05

## 2023-10-03 RX ADMIN — PANTOPRAZOLE SODIUM 40 MG: 40 INJECTION, POWDER, FOR SOLUTION INTRAVENOUS at 08:08

## 2023-10-03 RX ADMIN — MORPHINE SULFATE 2 MG: 2 INJECTION, SOLUTION INTRAMUSCULAR; INTRAVENOUS at 11:23

## 2023-10-03 RX ADMIN — CARBIDOPA AND LEVODOPA 3 TABLET: 25; 100 TABLET ORAL at 08:09

## 2023-10-03 ASSESSMENT — PAIN DESCRIPTION - ORIENTATION
ORIENTATION: MID

## 2023-10-03 ASSESSMENT — PAIN SCALES - GENERAL
PAINLEVEL_OUTOF10: 1
PAINLEVEL_OUTOF10: 2
PAINLEVEL_OUTOF10: 0
PAINLEVEL_OUTOF10: 6
PAINLEVEL_OUTOF10: 0
PAINLEVEL_OUTOF10: 0
PAINLEVEL_OUTOF10: 6
PAINLEVEL_OUTOF10: 0

## 2023-10-03 ASSESSMENT — PAIN DESCRIPTION - LOCATION
LOCATION: ABDOMEN

## 2023-10-03 ASSESSMENT — PAIN DESCRIPTION - DESCRIPTORS
DESCRIPTORS: ACHING
DESCRIPTORS: ACHING
DESCRIPTORS: DISCOMFORT

## 2023-10-03 NOTE — PROGRESS NOTES
David Rico, SANTA - CNP  On behalf of Dr. Marline Bryan  Postoperative Progress Note  Pt Name: Babar Alcantar Record Number: [id number]  Date of Birth 1955   Today's Date: 10/3/2023  ASSESSMENT   POD # 3 S/p Exploratory laparotomy release of adhesive bands  Postoperative pain  as expected   Acute respiratory insuffiencey   Leukocytosis resolved   KUB - worsening of SBO suspect ileus   HX urinary retention postoperatively   SBFT - distention noted contrast reaches cecum   Bowel function returned  Low grade fever resolved   has a past medical history of Elevated PSA, High blood sugar, History of ulcer disease, and Urinary retention. PLANS   Analgesics and antiemetics as needed  IV hydration  Start clears   Clamp NG overnight   Increase activity as tolerated, ambulate, IS, C&DB  Lovenox  for DVT prophylaxis  GI prophylaxis   Labs as needed  Repeat KUB reviewed and discussed with Dr Wayne Vega  SBFT reviewed and discussed  Routine incisional care   remove Haynes Catheter monitor for urinary retention   Mercedez Gonzalez is doing a little better, his bowel function returned. He denies nausea or vomiting, has passed flatus and has had a bowel movement. Advance to ADULT DIET; Clear Liquid. His pain is controlled on current medications. NG tube clamp,  Haynes catheter removed. No concerns with incision.   Discussed KUB and plans for SBFT, answered questions   CURRENT MEDICATIONS   Scheduled Meds:   naloxegol  12.5 mg Oral QAM AC    pantoprazole  40 mg IntraVENous Daily    midazolam  1 mg IntraVENous Once    benzocaine   Mouth/Throat Once    sodium chloride flush  5-40 mL IntraVENous 2 times per day    enoxaparin  40 mg SubCUTAneous Q24H    carbidopa-levodopa  3 tablet Oral TID    tamsulosin  0.4 mg Oral Daily    melatonin  18 mg Oral Nightly     Continuous Infusions:   sodium chloride      dextrose 5% and 0.45% NaCl with KCl 20 mEq 75 mL/hr at 10/03/23 0646    sodium created using voice recognition software. It may contain minor errors which are inherent in voice recognition technology. **      Final report electronically signed by Dr. Constantine Belle on 9/30/2023 2:06 PM      CT ABDOMEN PELVIS W IV CONTRAST Additional Contrast? None   Final Result       1. High-grade small bowel obstruction causing distention of the stomach and proximal small bowel loops. The transition point is seen. This is in the mid abdomen. No obstructing lesion is seen. 2. Diverticulosis. **This report has been created using voice recognition software. It may contain minor errors which are inherent in voice recognition technology. **      Final report electronically signed by Dr. Constantine Belle on 9/30/2023 12:10 PM          Electronically signed by SANTA Sharif CNP on 10/3/2023 at 1:50 PM

## 2023-10-03 NOTE — PROGRESS NOTES
10/03/23 1309   Encounter Summary   Encounter Overview/Reason  Spiritual/Emotional Needs   Service Provided For: Patient   Referral/Consult From: [facility] [place]   Support System Spouse   Last Encounter  10/03/23   Complexity of Encounter Low   Begin Time 1304   End Time  1309   Total Time Calculated 5 min   Spiritual/Emotional needs   Type Spiritual Support   Assessment/Intervention/Outcome   Assessment Unable to assess   Intervention Prayer (assurance of)/[place]     During my encounter with the 76 yr old patient, I attempted to visit with the pt on 5K. The patient appears to be resting (not responsive) now and I didn't want to disturb the patient. I or another [facility] Management will attempt to visit the patient or the family at another time. The pt was admitted due to small bowel obstruction.

## 2023-10-03 NOTE — PROGRESS NOTES
No changes to mornings assessment . Patient  in bed wheels locked alarm on. Peyton Linder. [facility]

## 2023-10-03 NOTE — PLAN OF CARE
Problem: Pain  Goal: Verbalizes/displays adequate comfort level or baseline comfort level  Outcome: Progressing  Flowsheets (Taken 10/3/2023 0947)  Verbalizes/displays adequate comfort level or baseline comfort level: Encourage patient to monitor pain and request assistance     Problem: Safety - Adult  Goal: Free from fall injury  Outcome: Progressing  Flowsheets (Taken 10/3/2023 0947)  Free From Fall Injury: Instruct family/caregiver on patient safety     Problem: ABCDS Injury Assessment  Goal: Absence of physical injury  Outcome: Progressing  Flowsheets (Taken 10/3/2023 0947)  Absence of Physical Injury: Implement safety measures based on patient assessment

## 2023-10-03 NOTE — CARE COORDINATION
10/3/23, 2:54 PM EDT    DISCHARGE ON GOING EVALUATION    [facility] day: 3  Location: 5K-17/017-A Reason for admit: Dehydration [E86.0]  Small bowel obstruction ([address]) [K56.609]  SBO (small bowel obstruction) ([address]) [K56.609]  ROSALIA (acute kidney injury) ([address]) [N17.9]     Procedure:   9/30: CT A/P: High-grade small bowel obstruction causing distention of the stomach and proximal small bowel loops. The transition point is seen. This is in the mid abdomen. No obstructing lesion is seen. Diverticulosis. 9/30 Exploratory laparotomy release of adhesive bands. 10/2 KUB: worsening SBO. 10/2 Sm Bowel Follow Through: Mildly dilated small bowel loops and delayed transit of contrast without evidence of complete small bowel obstruction. Barriers to Discharge: Gen surgery following. IVF. IV morphine today. NG clamped. Haynes. Plan to repeat KUB tomorrow. PCP: Nisha MAYER  Readmission Risk Score: 11.5%    Patient Goals/Plan/Treatment Preferences: From home with wife Chi Hussein, living in [facility]. Has a cane. On admission, wife discussed maybe needing a walker for [employer]. Pt walked the halls today with walker without difficulty. Cont. To monitor mobility needs tomorrow.

## 2023-10-03 NOTE — PROGRESS NOTES
Patient alert  and oriented x4. Speech is clear and appropriate. Patient able to follow simple commands . PERRLA. Upper extremities warm and dry. Full sensation present with no numbness or tingling. Hand grasp strong and equal bilaterally INT in left hand antecubital. No S/S of infiltration or infection. Midline abdominal incision blanchable erythema. . Skin turgor and capillary refills < 3 . Arm drifts negative. Haynes catheter draining. Lungs are clear. Bowel sounds are hypoactive in all Quadrants. Abdominal  tender noted. NG left hare to  LIWS. Lower extremities  warm and dry. No edema . Full sensation present  no numbness or tingling. Pedal push and Pull  strong and equal.  Bony prominences  intact. Patients in bed  lowest position  wheels locked  bed alarm on Call light in hand. Felipa Gunn. T [facility] SN

## 2023-10-04 ENCOUNTER — APPOINTMENT (OUTPATIENT)
Dept: GENERAL RADIOLOGY | Age: 68
End: 2023-10-04
Payer: MEDICARE

## 2023-10-04 PROCEDURE — 6360000002 HC RX W HCPCS: Performed by: NURSE PRACTITIONER

## 2023-10-04 PROCEDURE — 2500000003 HC RX 250 WO HCPCS: Performed by: SURGERY

## 2023-10-04 PROCEDURE — 6370000000 HC RX 637 (ALT 250 FOR IP): Performed by: SURGERY

## 2023-10-04 PROCEDURE — C9113 INJ PANTOPRAZOLE SODIUM, VIA: HCPCS | Performed by: NURSE PRACTITIONER

## 2023-10-04 PROCEDURE — 6370000000 HC RX 637 (ALT 250 FOR IP): Performed by: OCCUPATIONAL THERAPIST

## 2023-10-04 PROCEDURE — 1200000000 HC SEMI PRIVATE

## 2023-10-04 PROCEDURE — APPSS30 APP SPLIT SHARED TIME 16-30 MINUTES: Performed by: NURSE PRACTITIONER

## 2023-10-04 PROCEDURE — 2580000003 HC RX 258: Performed by: SURGERY

## 2023-10-04 PROCEDURE — 6360000002 HC RX W HCPCS: Performed by: SURGERY

## 2023-10-04 PROCEDURE — 99024 POSTOP FOLLOW-UP VISIT: CPT | Performed by: NURSE PRACTITIONER

## 2023-10-04 PROCEDURE — 74018 RADEX ABDOMEN 1 VIEW: CPT

## 2023-10-04 RX ADMIN — Medication 18 MG: at 21:49

## 2023-10-04 RX ADMIN — CARBIDOPA AND LEVODOPA 3 TABLET: 25; 100 TABLET ORAL at 14:20

## 2023-10-04 RX ADMIN — CARBIDOPA AND LEVODOPA 3 TABLET: 25; 100 TABLET ORAL at 09:07

## 2023-10-04 RX ADMIN — SODIUM CHLORIDE, PRESERVATIVE FREE 10 ML: 5 INJECTION INTRAVENOUS at 09:07

## 2023-10-04 RX ADMIN — PANTOPRAZOLE SODIUM 40 MG: 40 INJECTION, POWDER, FOR SOLUTION INTRAVENOUS at 09:07

## 2023-10-04 RX ADMIN — Medication 4.8 MG: at 23:31

## 2023-10-04 RX ADMIN — ENOXAPARIN SODIUM 40 MG: 100 INJECTION SUBCUTANEOUS at 20:12

## 2023-10-04 RX ADMIN — TAMSULOSIN HYDROCHLORIDE 0.4 MG: 0.4 CAPSULE ORAL at 09:08

## 2023-10-04 RX ADMIN — CARBIDOPA AND LEVODOPA 3 TABLET: 25; 100 TABLET ORAL at 21:50

## 2023-10-04 RX ADMIN — POTASSIUM CHLORIDE, DEXTROSE MONOHYDRATE AND SODIUM CHLORIDE: 150; 5; 450 INJECTION, SOLUTION INTRAVENOUS at 12:26

## 2023-10-04 ASSESSMENT — PAIN SCALES - GENERAL
PAINLEVEL_OUTOF10: 0
PAINLEVEL_OUTOF10: 5

## 2023-10-04 NOTE — PROGRESS NOTES
David Rico, SANTA - CNP  On behalf of Dr. Tracy Doe  Postoperative Progress Note  Pt Name: Stephanie Harper Record Number: [id number]  Date of Birth 1955   Today's Date: 10/4/2023  ASSESSMENT   POD # 4 S/p Exploratory laparotomy release of adhesive bands  Postoperative pain  as expected   Acute respiratory insuffiencey resolved  KUB - mild ileus 10/4  SBFT - distention noted contrast reaches cecum   Bowel function returned  HX carola    has a past medical history of Elevated PSA, High blood sugar, History of ulcer disease, Parkinson's disease, and Urinary retention. PLANS   Analgesics and antiemetics as needed  IV hydration  Start sips of clears  NG tube fell out   Increase activity as tolerated, ambulate, IS, C&DB  Lovenox  for DVT prophylaxis  GI prophylaxis   Labs as needed  Repeat KUB reviewed and discussed with Dr Garfield Casey  SBFT reviewed and discussed  Routine incisional care   Haynes Catheter removed  monitor for urinary retention   [place] is doing a little better, he is having loose bowel movements. He denies nausea or vomiting. Feels hungry. Okay for  Diet NPO Exceptions are: Ice Chips, Sips of Clear Liquids, Popsicles. His pain is controlled on current medications. NG tube it out. Abdominal distention overnight has improved. Voiding well. No concerns with incision. Discussed KUB.  answered all questions   CURRENT MEDICATIONS   Scheduled Meds:   naloxegol  12.5 mg Oral QAM AC    pantoprazole  40 mg IntraVENous Daily    midazolam  1 mg IntraVENous Once    benzocaine   Mouth/Throat Once    sodium chloride flush  5-40 mL IntraVENous 2 times per day    enoxaparin  40 mg SubCUTAneous Q24H    carbidopa-levodopa  3 tablet Oral TID    tamsulosin  0.4 mg Oral Daily    melatonin  18 mg Oral Nightly     Continuous Infusions:   sodium chloride      dextrose 5% and 0.45% NaCl with KCl 20 mEq 75 mL/hr at 10/03/23 2140    sodium chloride       PRN VIEW)   Final Result   Worsening small bowel obstruction. **This report has been created using voice recognition software. It may contain minor errors which are inherent in voice recognition technology. **      Final report electronically signed by Dr. Angelica Carreno on 10/2/2023 7:15 AM      XR ABDOMEN (KUB) (SINGLE AP VIEW)   Final Result   Persistent distended small bowel loops in the left abdomen consistent with a persistent small bowel obstruction. **This report has been created using voice recognition software. It may contain minor errors which are inherent in voice recognition technology. **      Final report electronically signed by Dr. Angelica Carreno on 10/1/2023 7:55 AM      XR ABDOMEN FOR NG/OG/NE TUBE PLACEMENT   Final Result   1. Esophageal route tube tip in the stomach. **This report has been created using voice recognition software. It may contain minor errors which are inherent in voice recognition technology. **      Final report electronically signed by Dr. Angelica Carreno on 9/30/2023 2:06 PM      CT ABDOMEN PELVIS W IV CONTRAST Additional Contrast? None   Final Result       1. High-grade small bowel obstruction causing distention of the stomach and proximal small bowel loops. The transition point is seen. This is in the mid abdomen. No obstructing lesion is seen. 2. Diverticulosis. **This report has been created using voice recognition software. It may contain minor errors which are inherent in voice recognition technology. **      Final report electronically signed by Dr. Angelica Carreno on 9/30/2023 12:10 PM          Electronically signed by SANTA Rosenberg CNP on 10/4/2023 at 11:32 AM

## 2023-10-04 NOTE — PROGRESS NOTES
Pt is currently resting after little sleep from the previous night.   [address],  [place]

## 2023-10-04 NOTE — PLAN OF CARE
Problem: Pain  Goal: Verbalizes/displays adequate comfort level or baseline comfort level  Outcome: Progressing  Flowsheets (Taken 10/4/2023 0233)  Verbalizes/displays adequate comfort level or baseline comfort level:   Encourage patient to monitor pain and request assistance   Administer analgesics based on type and severity of pain and evaluate response   Consider cultural and social influences on pain and pain management   Assess pain using appropriate pain scale   Implement non-pharmacological measures as appropriate and evaluate response     Problem: Safety - Adult  Goal: Free from fall injury  Outcome: Progressing  Flowsheets (Taken 10/4/2023 0233)  Free From Fall Injury: Instruct family/caregiver on patient safety  Note: Patient has remained free of physical injury during this shift. Safe environment provided, call light within reach, and hourly rounding completed. Problem: ABCDS Injury Assessment  Goal: Absence of physical injury  Outcome: Progressing  Flowsheets (Taken 10/4/2023 0233)  Absence of Physical Injury: Implement safety measures based on patient assessment  Note: Patient has remained free of physical injury during this shift. Safe environment provided, call light within reach, and hourly rounding completed. Problem: Discharge Planning  Goal: Discharge to home or other facility with appropriate resources  Outcome: Progressing  Flowsheets (Taken 10/4/2023 0233)  Discharge to home or other facility with appropriate resources:   Identify barriers to discharge with patient and caregiver   Identify discharge learning needs (meds, wound care, etc)   Refer to discharge planning if patient needs post-hospital services based on physician order or complex needs related to functional status, cognitive ability or social support system   Arrange for needed discharge resources and transportation as appropriate   Care plan reviewed with patient.   Patient  verbalize understanding of the plan of care

## 2023-10-05 PROBLEM — E44.0 MODERATE MALNUTRITION (HCC): Status: ACTIVE | Noted: 2023-10-05

## 2023-10-05 PROCEDURE — 1200000000 HC SEMI PRIVATE

## 2023-10-05 PROCEDURE — 6360000002 HC RX W HCPCS: Performed by: SURGERY

## 2023-10-05 PROCEDURE — 6360000002 HC RX W HCPCS: Performed by: NURSE PRACTITIONER

## 2023-10-05 PROCEDURE — 99024 POSTOP FOLLOW-UP VISIT: CPT | Performed by: NURSE PRACTITIONER

## 2023-10-05 PROCEDURE — 6370000000 HC RX 637 (ALT 250 FOR IP): Performed by: SURGERY

## 2023-10-05 PROCEDURE — 6370000000 HC RX 637 (ALT 250 FOR IP): Performed by: NURSE PRACTITIONER

## 2023-10-05 PROCEDURE — 2580000003 HC RX 258: Performed by: SURGERY

## 2023-10-05 PROCEDURE — 2500000003 HC RX 250 WO HCPCS: Performed by: SURGERY

## 2023-10-05 PROCEDURE — C9113 INJ PANTOPRAZOLE SODIUM, VIA: HCPCS | Performed by: NURSE PRACTITIONER

## 2023-10-05 PROCEDURE — APPSS30 APP SPLIT SHARED TIME 16-30 MINUTES: Performed by: NURSE PRACTITIONER

## 2023-10-05 RX ADMIN — NALOXEGOL OXALATE 12.5 MG: 12.5 TABLET, FILM COATED ORAL at 05:28

## 2023-10-05 RX ADMIN — CARBIDOPA AND LEVODOPA 3 TABLET: 25; 100 TABLET ORAL at 20:37

## 2023-10-05 RX ADMIN — PANTOPRAZOLE SODIUM 40 MG: 40 INJECTION, POWDER, FOR SOLUTION INTRAVENOUS at 09:23

## 2023-10-05 RX ADMIN — CARBIDOPA AND LEVODOPA 3 TABLET: 25; 100 TABLET ORAL at 14:17

## 2023-10-05 RX ADMIN — POTASSIUM CHLORIDE, DEXTROSE MONOHYDRATE AND SODIUM CHLORIDE: 150; 5; 450 INJECTION, SOLUTION INTRAVENOUS at 13:28

## 2023-10-05 RX ADMIN — ENOXAPARIN SODIUM 40 MG: 100 INJECTION SUBCUTANEOUS at 20:37

## 2023-10-05 RX ADMIN — Medication 4.8 MG: at 05:29

## 2023-10-05 RX ADMIN — CARBIDOPA AND LEVODOPA 3 TABLET: 25; 100 TABLET ORAL at 09:20

## 2023-10-05 RX ADMIN — TAMSULOSIN HYDROCHLORIDE 0.4 MG: 0.4 CAPSULE ORAL at 09:19

## 2023-10-05 RX ADMIN — Medication 4.8 MG: at 01:40

## 2023-10-05 RX ADMIN — SODIUM CHLORIDE, PRESERVATIVE FREE 10 ML: 5 INJECTION INTRAVENOUS at 09:24

## 2023-10-05 RX ADMIN — POTASSIUM CHLORIDE, DEXTROSE MONOHYDRATE AND SODIUM CHLORIDE: 150; 5; 450 INJECTION, SOLUTION INTRAVENOUS at 01:39

## 2023-10-05 ASSESSMENT — PAIN DESCRIPTION - LOCATION: LOCATION: ABDOMEN

## 2023-10-05 ASSESSMENT — PAIN DESCRIPTION - FREQUENCY: FREQUENCY: CONTINUOUS

## 2023-10-05 ASSESSMENT — PAIN SCALES - GENERAL: PAINLEVEL_OUTOF10: 2

## 2023-10-05 ASSESSMENT — PAIN DESCRIPTION - ORIENTATION: ORIENTATION: MID

## 2023-10-05 ASSESSMENT — PAIN DESCRIPTION - ONSET: ONSET: ON-GOING

## 2023-10-05 ASSESSMENT — PAIN DESCRIPTION - DESCRIPTORS: DESCRIPTORS: SORE

## 2023-10-05 ASSESSMENT — PAIN DESCRIPTION - PAIN TYPE: TYPE: SURGICAL PAIN

## 2023-10-05 NOTE — PROGRESS NOTES
David Rico, SANTA - CNP  On behalf of Dr. Milagro Isidro  Postoperative Progress Note  Pt Name: Wilene Councilman Record Number: [id number]  Date of Birth 1955   Today's Date: 10/5/2023  ASSESSMENT   POD # 5 S/p Exploratory laparotomy release of adhesive bands  Postoperative pain  as expected   Acute respiratory insuffiencey resolved  KUB - mild ileus 10/4  SBFT - distention noted contrast reaches cecum   Bowel function returned  HX carola    has a past medical history of Elevated PSA, High blood sugar, History of ulcer disease, Parkinson's disease, and Urinary retention. PLANS   Analgesics and antiemetics as needed  IV hydration  Start  clears  NG tube fell out   Increase activity as tolerated, ambulate, IS, C&DB  Lovenox  for DVT prophylaxis  GI prophylaxis   Labs as needed  Repeat KUB reviewed and discussed with Dr Shari Ceja  SBFT reviewed and discussed  Routine incisional care   Haynes Catheter removed  monitor for urinary retention   [place] Galley is doing a  better, he is having formed bowel movements. He denies nausea or vomiting. Feels hungry. Okay for  ADULT DIET; Clear Liquid. His pain is controlled on current medications. Abdominal distention overnight has improved. Voiding well. Mild erythema - monitor. Discussed KUB. answered all questions   CURRENT MEDICATIONS   Scheduled Meds:   naloxegol  12.5 mg Oral QAM AC    pantoprazole  40 mg IntraVENous Daily    midazolam  1 mg IntraVENous Once    benzocaine   Mouth/Throat Once    sodium chloride flush  5-40 mL IntraVENous 2 times per day    enoxaparin  40 mg SubCUTAneous Q24H    carbidopa-levodopa  3 tablet Oral TID    tamsulosin  0.4 mg Oral Daily    melatonin  18 mg Oral Nightly     Continuous Infusions:   sodium chloride      dextrose 5% and 0.45% NaCl with KCl 20 mEq 75 mL/hr at 10/05/23 0139    sodium chloride       PRN Meds:. Menthol, diatrizoate meglumine-sodium, sodium chloride flush, sodium without evidence of complete small bowel obstruction. Final report electronically signed by Dr. Chad Hanna on 10/2/2023 4:49 PM      XR ABDOMEN (KUB) (SINGLE AP VIEW)   Final Result   Worsening small bowel obstruction. **This report has been created using voice recognition software. It may contain minor errors which are inherent in voice recognition technology. **      Final report electronically signed by Dr. Tayler Alexander on 10/2/2023 7:15 AM      XR ABDOMEN (KUB) (SINGLE AP VIEW)   Final Result   Persistent distended small bowel loops in the left abdomen consistent with a persistent small bowel obstruction. **This report has been created using voice recognition software. It may contain minor errors which are inherent in voice recognition technology. **      Final report electronically signed by Dr. Tayler Alexander on 10/1/2023 7:55 AM      XR ABDOMEN FOR NG/OG/NE TUBE PLACEMENT   Final Result   1. Esophageal route tube tip in the stomach. **This report has been created using voice recognition software. It may contain minor errors which are inherent in voice recognition technology. **      Final report electronically signed by Dr. Tayler Alexander on 9/30/2023 2:06 PM      CT ABDOMEN PELVIS W IV CONTRAST Additional Contrast? None   Final Result       1. High-grade small bowel obstruction causing distention of the stomach and proximal small bowel loops. The transition point is seen. This is in the mid abdomen. No obstructing lesion is seen. 2. Diverticulosis. **This report has been created using voice recognition software. It may contain minor errors which are inherent in voice recognition technology. **      Final report electronically signed by Dr. Tayler Alexander on 9/30/2023 12:10 PM          Electronically signed by SANTA Trujillo CNP on 10/5/2023 at 9:39 AM

## 2023-10-05 NOTE — PLAN OF CARE
Problem: Pain  Goal: Verbalizes/displays adequate comfort level or baseline comfort level  10/5/2023 1224 by Megan Manuel RN  Outcome: Progressing  Flowsheets (Taken 10/5/2023 0009 by Katlyn Gonzalez RN)  Verbalizes/displays adequate comfort level or baseline comfort level:   Encourage patient to monitor pain and request assistance   Assess pain using appropriate pain scale   Administer analgesics based on type and severity of pain and evaluate response   Implement non-pharmacological measures as appropriate and evaluate response  10/5/2023 0009 by Katlyn Gonzalez RN  Outcome: Progressing  Flowsheets (Taken 10/5/2023 0009)  Verbalizes/displays adequate comfort level or baseline comfort level:   Encourage patient to monitor pain and request assistance   Assess pain using appropriate pain scale   Administer analgesics based on type and severity of pain and evaluate response   Implement non-pharmacological measures as appropriate and evaluate response     Problem: Safety - Adult  Goal: Free from fall injury  10/5/2023 1224 by Megan Manuel RN  Outcome: Progressing  Flowsheets (Taken 10/5/2023 0009 by Katlyn Gonzalez RN)  Free From Fall Injury: Instruct family/caregiver on patient safety  10/5/2023 0009 by Katlyn Gonzalez RN  Outcome: Progressing  Flowsheets (Taken 10/5/2023 0009)  Free From Fall Injury: Instruct family/caregiver on patient safety     Problem: ABCDS Injury Assessment  Goal: Absence of physical injury  10/5/2023 1224 by Megan Manuel RN  Outcome: Progressing  [address] (Taken 10/5/2023 0009 by Katlyn Gonzalez RN)  Absence of Physical Injury: Implement safety measures based on patient assessment  10/5/2023 0009 by Katlyn Gonzalez RN  Outcome: Progressing  Flowsheets (Taken 10/5/2023 0009)  Absence of Physical Injury: Implement safety measures based on patient assessment     Problem: Skin/Tissue Integrity  Goal: Absence of new skin breakdown  Description: 1.   Monitor for areas of

## 2023-10-05 NOTE — CARE COORDINATION
10/5/23, 1:59 PM EDT    DISCHARGE ON GOING EVALUATION    [facility] day: 5  Location: -17/017-A Reason for admit: Dehydration [E86.0]  Small bowel obstruction ([address]) [P42.971]  SBO (small bowel obstruction) ([address]) [K56.609]  ROSALIA (acute kidney injury) ([address]) [N17.9]   Procedure:   9/30/2023   Exploratory laparotomy release of adhesive bands. 10/05 x-ray of abdomen:  1. Oral contrast is now in the descending colon and rectum. 2. Persistent mild small bowel ileus. Barriers to Discharge: IV fluids with Kcl added, Lovenox, IV Protonix, Movantik, prn Tylenol, Morphine, and Zofran, clear liquid diet, SCD's, up as tolerated. PCP: Niraj Bustos  Readmission Risk Score: 7.8%  Patient Goals/Plan/Treatment Preferences: Feliciano Sanchez is from home with his wife. No needs at discharge.

## 2023-10-05 NOTE — PROGRESS NOTES
Comprehensive Nutrition Assessment    Type and Reason for Visit:  Initial, NPO/Clear Liquid (NPO x 5 days)    Nutrition Recommendations/Plan:   Continue to advance diet as medically appropriate and encourage PO intake at best efforts as tolerated. ONS ordered: ensure clear TID, will transition to regular ensure when advanced to full liquids. Note: does not like chocolate, send strawberry or vanilla. When diet advanced will order activia yogurt daily. If unable to advance diet and TPN indicated recommend the following: 10 kcals/kg, 1 g/kg protein, 20% lipid kcals with dosing weight 74 kg. Monitoring all nutrition aspects and will provide further recommendations as necessary and appropriate. Malnutrition Assessment:  Malnutrition Status: Moderate malnutrition (10/05/23 1029)    Context:  Acute Illness     Findings of the 6 clinical characteristics of malnutrition:  Energy Intake:  50% or less of estimated energy requirements for 5 or more days (NPO/CLD entire admissions and few days PTA)  Weight Loss:   (unable to fully assess d/t only one weight recorded this admit)     Body Fat Loss:  Mild body fat loss Orbital, Triceps, Buccal region   Muscle Mass Loss:  Mild muscle mass loss Temples (temporalis), Scapula (trapezius)  Fluid Accumulation:  No significant fluid accumulation     Strength:  Not Performed    Nutrition Assessment:     Pt. moderately malnourished AEB criteria as listed above. At risk for further nutrition compromise r/t admit with abdominal distention, nausea, vomiting, constipation~ s/p laparotomy exploratory 9/30/23, NPO/CLD x 5 days, KUB 10/4 demonstrated mild ileus, and underlying medical condition (PMH: Parkinson's disease). Nutrition Related Findings:    Pt. Report/Treatments/Miscellaneous: pt seen at bedside with wife this morning. Reports feeling okay. Denies nausea. Just began trying clear liquids this morning~ so far has consumed half of a jello cup.  Pt reports so far

## 2023-10-06 VITALS
HEART RATE: 86 BPM | TEMPERATURE: 98.7 F | DIASTOLIC BLOOD PRESSURE: 77 MMHG | OXYGEN SATURATION: 98 % | RESPIRATION RATE: 16 BRPM | SYSTOLIC BLOOD PRESSURE: 116 MMHG | BODY MASS INDEX: 25.74 KG/M2 | WEIGHT: 164.02 LBS | HEIGHT: 67 IN

## 2023-10-06 PROBLEM — K56.609 SMALL BOWEL OBSTRUCTION (HCC): Status: RESOLVED | Noted: 2023-09-30 | Resolved: 2023-10-06

## 2023-10-06 PROCEDURE — 6370000000 HC RX 637 (ALT 250 FOR IP): Performed by: SURGERY

## 2023-10-06 PROCEDURE — 6370000000 HC RX 637 (ALT 250 FOR IP): Performed by: NURSE PRACTITIONER

## 2023-10-06 PROCEDURE — APPSS30 APP SPLIT SHARED TIME 16-30 MINUTES: Performed by: NURSE PRACTITIONER

## 2023-10-06 PROCEDURE — 2500000003 HC RX 250 WO HCPCS: Performed by: SURGERY

## 2023-10-06 PROCEDURE — 6360000002 HC RX W HCPCS: Performed by: NURSE PRACTITIONER

## 2023-10-06 PROCEDURE — C9113 INJ PANTOPRAZOLE SODIUM, VIA: HCPCS | Performed by: NURSE PRACTITIONER

## 2023-10-06 PROCEDURE — 99024 POSTOP FOLLOW-UP VISIT: CPT | Performed by: NURSE PRACTITIONER

## 2023-10-06 RX ORDER — SULFAMETHOXAZOLE AND TRIMETHOPRIM 800; 160 MG/1; MG/1
1 TABLET ORAL EVERY 12 HOURS SCHEDULED
Qty: 20 TABLET | Refills: 0 | Status: SHIPPED | OUTPATIENT
Start: 2023-10-06 | End: 2023-10-16

## 2023-10-06 RX ORDER — SULFAMETHOXAZOLE AND TRIMETHOPRIM 800; 160 MG/1; MG/1
1 TABLET ORAL EVERY 12 HOURS SCHEDULED
Status: DISCONTINUED | OUTPATIENT
Start: 2023-10-06 | End: 2023-10-06 | Stop reason: HOSPADM

## 2023-10-06 RX ORDER — HYDROCODONE BITARTRATE AND ACETAMINOPHEN 5; 325 MG/1; MG/1
1 TABLET ORAL EVERY 6 HOURS PRN
Qty: 6 TABLET | Refills: 0 | Status: SHIPPED | OUTPATIENT
Start: 2023-10-06 | End: 2023-10-09

## 2023-10-06 RX ADMIN — CARBIDOPA AND LEVODOPA 3 TABLET: 25; 100 TABLET ORAL at 10:14

## 2023-10-06 RX ADMIN — CARBIDOPA AND LEVODOPA 3 TABLET: 25; 100 TABLET ORAL at 14:34

## 2023-10-06 RX ADMIN — SULFAMETHOXAZOLE AND TRIMETHOPRIM 1 TABLET: 800; 160 TABLET ORAL at 12:11

## 2023-10-06 RX ADMIN — PANTOPRAZOLE SODIUM 40 MG: 40 INJECTION, POWDER, FOR SOLUTION INTRAVENOUS at 10:15

## 2023-10-06 RX ADMIN — NALOXEGOL OXALATE 12.5 MG: 12.5 TABLET, FILM COATED ORAL at 05:10

## 2023-10-06 RX ADMIN — TAMSULOSIN HYDROCHLORIDE 0.4 MG: 0.4 CAPSULE ORAL at 10:15

## 2023-10-06 RX ADMIN — POTASSIUM CHLORIDE, DEXTROSE MONOHYDRATE AND SODIUM CHLORIDE: 150; 5; 450 INJECTION, SOLUTION INTRAVENOUS at 02:14

## 2023-10-06 NOTE — DISCHARGE SUMMARY
Discharge Summary     Patient Identification:  Ivon Castro  : 1955  MRN: [id number]   Account: [de-identified]     Admit date: 2023  Discharge date: 10/6/2023   Attending provider: Ilya Bueno MD        Primary care provider: Andrew Fields     Discharge Diagnoses:   Principal Problem (Resolved):    Small bowel obstruction [facility])  Active Problems:    S/P exploratory laparotomy    Moderate malnutrition [facility])       Hospital Course:   Ivon Castro is a 76 y.o. male admitted to [facility] on 2023 for small bowel obstruction. he was taken to the operative suite per Stewart Mary MD and the planned procedure was performed as noted above. He was admitted to [facility] for postoperative care and was initially managed with nasogastric decompression, bowel rest, analgesics for pain control, IV fluid hydration, GI and DVT prophylaxis. Over the hospital stay he slowly improved in ability to tolerate increasing levels of activity and to take po fluids, solid foods with evidence of returning bowel function, and spontaneously voiding. At the time of discharge he was able to tolerate pain with oral analgesic and was medically stable. Procedures:   Date of Procedure: 2023     Pre-Op Diagnosis Codes:     * S/P small bowel resection [Z90.49]     Post-Op Diagnosis:  bowel obstruction secondary to adhesive band with associated internal hernia        Procedure(s):  LAPAROTOMY EXPLORATORY     Surgeon(s):   Ilya Bueno MD     Assistant:   * No surgical staff found *     Anesthesia: General     Estimated Blood Loss (mL): 15 ml     Complications: None     Specimens:   * No specimens in log *     Implants:  * No implants in log *      Drains:   Urinary Catheter 23 Haynes-Temperature (Active)         Findings:   Adhesive band between the mesentery as lead point for internal hernia and obstruction            Detailed Description of Procedure:   He was seen department holding informed consent

## 2023-10-06 NOTE — CARE COORDINATION
10/6/23, 2:18 PM EDT    Patient goals/plan/ treatment preferences discussed by  and . Patient goals/plan/ treatment preferences reviewed with patient/ family. Patient/ family verbalize understanding of discharge plan and are in agreement with goal/plan/treatment preferences. Understanding was demonstrated using the teach back method. AVS provided by RN at time of discharge, which includes all necessary medical information pertaining to the patients current course of illness, treatment, post-discharge goals of care, and treatment preferences. Services At/After Discharge: None       IMM Letter  IMM Letter given to Patient/Family/Significant other/Guardian/POA/by[de-identified] Keyona Sanchez RN Case Mgr.   IMM Letter date given[de-identified] 10/06/23  IMM Letter time given[de-identified] 9730

## 2023-10-06 NOTE — PLAN OF CARE
Problem: Pain  Goal: Verbalizes/displays adequate comfort level or baseline comfort level  10/6/2023 0002 by Rina Faye RN  Outcome: Progressing  Flowsheets (Taken 10/5/2023 0009 by Fernando Rios, RN)  Verbalizes/displays adequate comfort level or baseline comfort level:   Encourage patient to monitor pain and request assistance   Assess pain using appropriate pain scale   Administer analgesics based on type and severity of pain and evaluate response   Implement non-pharmacological measures as appropriate and evaluate response  10/5/2023 1224 by Opal Lorenzana RN  Outcome: Progressing  Flowsheets (Taken 10/5/2023 0009 by Fernando Rios, RN)  Verbalizes/displays adequate comfort level or baseline comfort level:   Encourage patient to monitor pain and request assistance   Assess pain using appropriate pain scale   Administer analgesics based on type and severity of pain and evaluate response   Implement non-pharmacological measures as appropriate and evaluate response     Problem: Safety - Adult  Goal: Free from fall injury  10/6/2023 0002 by Aleks Willson, RN  Outcome: Progressing  Flowsheets (Taken 10/5/2023 0009 by Fernando Rios RN)  Free From Fall Injury: Instruct family/caregiver on patient safety  10/5/2023 1224 by Opal Lorenzana RN  Outcome: Progressing  Flowsheets (Taken 10/5/2023 0009 by Fernando Rios, RN)  Free From Fall Injury: Instruct family/caregiver on patient safety     Problem: ABCDS Injury Assessment  Goal: Absence of physical injury  10/6/2023 0002 by Rina Faye RN  Outcome: Progressing  Flowsheets (Taken 10/5/2023 0009 by Fernando Rios, RN)  Absence of Physical Injury: Implement safety measures based on patient assessment  10/5/2023 1224 by Opal Lorenzana RN  Outcome: Progressing  Flowsheets (Taken 10/5/2023 0009 by Fernando Rios RN)  Absence of Physical Injury: Implement safety measures based on patient assessment     Problem: Skin/Tissue monitor for signs and symptoms of infection   Monitor lab/diagnostic results   Monitor all insertion sites i.e., indwelling lines, tubes and drains   [place] appropriate cooling/warming therapies per order   Administer medications as ordered   Instruct and encourage patient and family to use good hand hygiene technique     Problem: Nutrition Deficit:  Goal: Optimize nutritional status  10/6/2023 0002 by Aleks Farmer RN  Outcome: Progressing  Flowsheets (Taken 10/5/2023 1048 by Yesi Puri RD)  Nutrient intake appropriate for improving, restoring, or maintaining nutritional needs:   Assess nutritional status and recommend course of action   Monitor oral intake, labs, and treatment plans   Recommend appropriate diets, oral nutritional supplements, and vitamin/mineral supplements   Recommend, monitor, and adjust tube feedings and TPN/PPN based on assessed needs   Provide specific nutrition education to patient or family as appropriate  10/5/2023 1224 by Devyn Leung RN  Outcome: Progressing  Flowsheets (Taken 10/5/2023 1048 by Yesi Puri RD)  Nutrient intake appropriate for improving, restoring, or maintaining nutritional needs:   Assess nutritional status and recommend course of action   Monitor oral intake, labs, and treatment plans   Recommend appropriate diets, oral nutritional supplements, and vitamin/mineral supplements   Recommend, monitor, and adjust tube feedings and TPN/PPN based on assessed needs   Provide specific nutrition education to patient or family as appropriate  10/5/2023 1048 by Yesi Puri RD  Flowsheets (Taken 10/5/2023 1048)  Nutrient intake appropriate for improving, restoring, or maintaining nutritional needs:   Assess nutritional status and recommend course of action   Monitor oral intake, labs, and treatment plans   Recommend appropriate diets, oral nutritional supplements, and vitamin/mineral supplements   Recommend, monitor, and adjust tube feedings and TPN/PPN based

## 2023-10-06 NOTE — PLAN OF CARE
Problem: Pain  Goal: Verbalizes/displays adequate comfort level or baseline comfort level  Outcome: Adequate for Discharge  Flowsheets (Taken 10/6/2023 1835)  Verbalizes/displays adequate comfort level or baseline comfort level:   Encourage patient to monitor pain and request assistance   Assess pain using appropriate pain scale   Administer analgesics based on type and severity of pain and evaluate response     Problem: Safety - Adult  Goal: Free from fall injury  Outcome: Adequate for Discharge  Flowsheets (Taken 10/6/2023 1835)  Free From Fall Injury: Instruct family/caregiver on patient safety     Problem: ABCDS Injury Assessment  Goal: Absence of physical injury  Outcome: Adequate for Discharge  Flowsheets (Taken 10/6/2023 1835)  Absence of Physical Injury: Implement safety measures based on patient assessment     Problem: Skin/Tissue Integrity  Goal: Absence of new skin breakdown  Description: 1. Monitor for areas of redness and/or skin breakdown  2. Assess vascular access sites hourly  3. Every 4-6 hours minimum:  Change oxygen saturation probe site  4. Every 4-6 hours:  If on nasal continuous positive airway pressure, respiratory therapy assess nares and determine need for appliance change or resting period.   Outcome: Adequate for Discharge     Problem: Discharge Planning  Goal: Discharge to home or other facility with appropriate resources  Outcome: Adequate for Discharge  Flowsheets (Taken 10/6/2023 1835)  Discharge to home or other facility with appropriate resources:   Identify barriers to discharge with patient and caregiver   Arrange for needed discharge resources and transportation as appropriate   Identify discharge learning needs (meds, wound care, etc)   Arrange for interpreters to assist at discharge as needed     Problem: Gastrointestinal - Adult  Goal: Minimal or absence of nausea and vomiting  Outcome: Adequate for Discharge  Flowsheets (Taken 10/6/2023 1835)  Minimal or absence of nausea

## 2023-10-06 NOTE — DISCHARGE INSTRUCTIONS
DR. Merrill Counts DISCHARGE INSTRUCTIONS    Pt Name: Sonja Mcnair  Medical Record Number: [id number]  Today's Date: 10/6/2023    GENERAL ANESTHESIA OR SEDATION  1. Do not drive or operate hazardous machinery for 24 hours. 2. Do not make important business or personal decisions for 24 hours. 3. Do not drink alcoholic beverages or use tobacco for 24 hours. ACTIVITY INSTRUCTIONS:  Ambulate 4 times a day for approximately 10-15  minutes each time     You may resume normal activity tomorrow. Do not engage in strenuous activity that may place stress on your incision. You may drive when no longer taking pain medication and you are able to comfortably use the gas/brake pedal. Do not drive long distance, in town driving recommended    avoid heavy lifting, tugging, pullings greater than 10-15 lbs for 6 weeks postoperatively, or until released by Physician/CNP      DIET INSTRUCTIONS:  Normal at home diet    MEDICATIONS  You may resume your daily prescription medication schedule unless otherwise specified. Pain medication at discharge - use only as prescribed- refills may be available to you at your follow up appointments if needed and warranted. Narcotics should be used for only short term and we highly encouraged our patients to wean off appropriately and use other means for pain such as non pharmacologic measures and over the counter tylenol or ibuprofen if no restrictions apply. We do  know that surgical pain is real and will not hesitate to help eliminate some of your discomfort.  However we will not be able to completely make you pain free and it is important to determine what pain level is tolerable for you     Narcotics cause constipation and we recommend taking a colace daily and Miralax if needed to help reduce the risk of constipation    Increasing water intake if no restrictions will also help eliminate constipation    Ambulation 4 times a day 15 minute each time will help reduce pain each day and help

## 2023-10-06 NOTE — PROGRESS NOTES
David Rico, SANTA - CNP  On behalf of Dr. Esme Castillo  Postoperative Progress Note  Pt Name: Papito Chaney Record Number: [id number]  Date of Birth 1955   Today's Date: 10/6/2023  ASSESSMENT   POD # 6 S/p Exploratory laparotomy release of adhesive bands  Postoperative pain minimal  Acute respiratory insuffiencey resolved  KUB - mild ileus 10/4 resolved   SBFT - distention noted contrast reaches cecum   Bowel function returned  HX carola    has a past medical history of Elevated PSA, High blood sugar, History of ulcer disease, Parkinson's disease, and Urinary retention. PLANS   Analgesics and antiemetics as needed  IV hydration dc'd   Fulls advance to regular diet   NG tube fell out   Increase activity as tolerated, ambulate, IS, C&DB  Lovenox  for DVT prophylaxis  GI prophylaxis   Labs as needed  Repeat KUB reviewed and discussed with Dr Elizabeth Simpson  SBFT reviewed and discussed  Routine incisional care   Haynes Catheter removed  monitor for urinary retention   Plan to discharge later today if tolerates regular diet   Huy Hernandez is doing a  better, he is having formed bowel movements. He denies nausea or vomiting. Tolerates a full liquid diet,  Okay for  ADULT ORAL NUTRITION SUPPLEMENT; Breakfast, Lunch, Dinner; Other Oral Supplement; ensure plus TID - STRAWBERRY OR VANILLA ONLY  ADULT DIET; Regular. His pain is controlled no narcotics needed at this time. No Abdominal distention. Voiding well. Mild erythema of incision start bactrim. Discussed discharge plans and instruction.  Answered all questions   CURRENT MEDICATIONS   Scheduled Meds:   sulfamethoxazole-trimethoprim  1 tablet Oral 2 times per day    naloxegol  12.5 mg Oral QAM AC    pantoprazole  40 mg IntraVENous Daily    midazolam  1 mg IntraVENous Once    benzocaine   Mouth/Throat Once    sodium chloride flush  5-40 mL IntraVENous 2 times per day    enoxaparin  40 mg SubCUTAneous Q24H \"CREATININE\", \"MG\", \"PHOS\", \"CALCIUM\" in the last 72 hours. No results for input(s): \"PTT\", \"INR\" in the last 72 hours. Invalid input(s): \"PT\"  No results for input(s): \"AST\", \"ALT\", \"BILITOT\", \"BILIDIR\", \"AMYLASE\", \"LIPASE\", \"LDH\", \"LACTA\" in the last 72 hours. No results for input(s): \"TROPONINT\" in the last 72 hours. RADIOLOGY   Narrative & Impression  PROCEDURE: XR ABDOMEN (KUB) (SINGLE AP VIEW)     CLINICAL INFORMATION: Follow up ileus. COMPARISON: 10/3/2023 and 10/2/2023. TECHNIQUE: A supine AP view of the abdomen was obtained. 2 films. FINDINGS:      There are mildly distended air-filled bowel loops in the right abdomen. There is some air throughout the transverse colon. Oral contrast is in the descending colon and rectum. This was given 2 days ago. There are no displaced bowel loops. There is no gross free air. No suspicious densities are present. There are some degenerative changes in the spine. IMPRESSION:  1. Oral contrast is now in the descending colon and rectum. 2. Persistent mild small bowel ileus. **This report has been created using voice recognition software. It may contain minor errors which are inherent in voice recognition technology. **     Final report electronically signed by Dr. Kylie Thayer on 10/4/2023 7:18 AM      XR ABDOMEN (KUB) (SINGLE AP VIEW)   Final Result   1. Oral contrast is now in the descending colon and rectum. 2. Persistent mild small bowel ileus. **This report has been created using voice recognition software. It may contain minor errors which are inherent in voice recognition technology. **      Final report electronically signed by Dr. Kylie Thayer on 10/4/2023 7:18 AM      XR ABDOMEN FOR NG/OG/NE TUBE PLACEMENT   Final Result   The nasogastric catheter extends below the hemidiaphragms with tip in the projection of the stomach. **This report has been created using voice recognition software.   It

## 2023-10-07 ENCOUNTER — HOSPITAL ENCOUNTER (EMERGENCY)
Age: 68
Discharge: HOME | End: 2023-10-07
Attending: STUDENT IN AN ORGANIZED HEALTH CARE EDUCATION/TRAINING PROGRAM
Payer: MEDICARE

## 2023-10-07 VITALS
WEIGHT: 159 LBS | SYSTOLIC BLOOD PRESSURE: 117 MMHG | BODY MASS INDEX: 24.96 KG/M2 | HEIGHT: 67 IN | TEMPERATURE: 98.2 F | OXYGEN SATURATION: 96 % | RESPIRATION RATE: 18 BRPM | HEART RATE: 83 BPM | DIASTOLIC BLOOD PRESSURE: 77 MMHG

## 2023-10-07 DIAGNOSIS — R33.8 ACUTE URINARY RETENTION: Primary | ICD-10-CM

## 2023-10-07 LAB
ANION GAP SERPL CALC-SCNC: 13 MEQ/L (ref 8–16)
BASOPHILS ABSOLUTE: 0 THOU/MM3 (ref 0–0.1)
BASOPHILS NFR BLD AUTO: 0.5 %
BILIRUB UR QL STRIP.AUTO: NEGATIVE
BUN SERPL-MCNC: 14 MG/DL (ref 7–22)
CALCIUM SERPL-MCNC: 8.7 MG/DL (ref 8.5–10.5)
CHARACTER UR: CLEAR
CHLORIDE SERPL-SCNC: 99 MEQ/L (ref 98–111)
CO2 SERPL-SCNC: 23 MEQ/L (ref 23–33)
COLOR: YELLOW
CREAT SERPL-MCNC: 1.1 MG/DL (ref 0.4–1.2)
DEPRECATED RDW RBC AUTO: 45.5 FL (ref 35–45)
EOSINOPHIL NFR BLD AUTO: 1.6 %
EOSINOPHILS ABSOLUTE: 0.1 THOU/MM3 (ref 0–0.4)
ERYTHROCYTE [DISTWIDTH] IN BLOOD BY AUTOMATED COUNT: 13.4 % (ref 11.5–14.5)
GFR SERPL CREATININE-BSD FRML MDRD: > 60 ML/MIN/1.73M2
GLUCOSE SERPL-MCNC: 99 MG/DL (ref 70–108)
GLUCOSE UR QL STRIP.AUTO: NEGATIVE MG/DL
HCT VFR BLD AUTO: 43.5 % (ref 42–52)
HGB BLD-MCNC: 14.5 GM/DL (ref 14–18)
HGB UR QL STRIP.AUTO: NEGATIVE
IMM GRANULOCYTES # BLD AUTO: 0.16 THOU/MM3 (ref 0–0.07)
IMM GRANULOCYTES NFR BLD AUTO: 1.9 %
KETONES UR QL STRIP.AUTO: ABNORMAL
LYMPHOCYTES ABSOLUTE: 0.9 THOU/MM3 (ref 1–4.8)
LYMPHOCYTES NFR BLD AUTO: 11.3 %
MCH RBC QN AUTO: 30.9 PG (ref 26–33)
MCHC RBC AUTO-ENTMCNC: 33.3 GM/DL (ref 32.2–35.5)
MCV RBC AUTO: 92.6 FL (ref 80–94)
MONOCYTES ABSOLUTE: 1.4 THOU/MM3 (ref 0.4–1.3)
MONOCYTES NFR BLD AUTO: 16.4 %
NEUTROPHILS NFR BLD AUTO: 68.3 %
NITRITE UR QL STRIP: NEGATIVE
NRBC BLD AUTO-RTO: 0 /100 WBC
OSMOLALITY SERPL CALC.SUM OF ELEC: 270.6 MOSMOL/KG (ref 275–300)
PH UR STRIP.AUTO: 6 [PH] (ref 5–9)
PLATELET # BLD AUTO: 266 THOU/MM3 (ref 130–400)
PMV BLD AUTO: 10.5 FL (ref 9.4–12.4)
POTASSIUM SERPL-SCNC: 3.9 MEQ/L (ref 3.5–5.2)
PROT UR STRIP.AUTO-MCNC: NEGATIVE MG/DL
RBC # BLD AUTO: 4.7 MILL/MM3 (ref 4.7–6.1)
SEGMENTED NEUTROPHILS ABSOLUTE COUNT: 5.7 THOU/MM3 (ref 1.8–7.7)
SODIUM SERPL-SCNC: 135 MEQ/L (ref 135–145)
SP GR UR REFRACT.AUTO: 1.01 (ref 1–1.03)
UROBILINOGEN, URINE: 0.2 EU/DL (ref 0–1)
WBC # BLD AUTO: 8.3 THOU/MM3 (ref 4.8–10.8)
WBC #/AREA URNS HPF: NEGATIVE /[HPF]

## 2023-10-07 PROCEDURE — 80048 BASIC METABOLIC PNL TOTAL CA: CPT

## 2023-10-07 PROCEDURE — 81003 URINALYSIS AUTO W/O SCOPE: CPT

## 2023-10-07 PROCEDURE — 85025 COMPLETE CBC W/AUTO DIFF WBC: CPT

## 2023-10-07 PROCEDURE — 2580000003 HC RX 258: Performed by: STUDENT IN AN ORGANIZED HEALTH CARE EDUCATION/TRAINING PROGRAM

## 2023-10-07 PROCEDURE — 51702 INSERT TEMP BLADDER CATH: CPT

## 2023-10-07 PROCEDURE — 99284 EMERGENCY DEPT VISIT MOD MDM: CPT

## 2023-10-07 PROCEDURE — 36415 COLL VENOUS BLD VENIPUNCTURE: CPT

## 2023-10-07 PROCEDURE — 51798 US URINE CAPACITY MEASURE: CPT

## 2023-10-07 RX ORDER — 0.9 % SODIUM CHLORIDE 0.9 %
1000 INTRAVENOUS SOLUTION INTRAVENOUS ONCE
Status: COMPLETED | OUTPATIENT
Start: 2023-10-07 | End: 2023-10-07

## 2023-10-07 RX ADMIN — SODIUM CHLORIDE 1000 ML: 9 INJECTION, SOLUTION INTRAVENOUS at 04:47

## 2023-10-07 ASSESSMENT — PAIN - FUNCTIONAL ASSESSMENT: PAIN_FUNCTIONAL_ASSESSMENT: NONE - DENIES PAIN

## 2023-10-07 ASSESSMENT — ENCOUNTER SYMPTOMS
ABDOMINAL PAIN: 1
ABDOMINAL DISTENTION: 1

## 2023-10-07 NOTE — DISCHARGE INSTRUCTIONS
If you had a wesley catheter placed in the emergency department, then make sure that you drain the leg bag when it gets full. Contact the urologist as indicated for follow up.      PLEASE RETURN TO THE EMERGENCY DEPARTMENT IMMEDIATELY for worsening symptoms, inability to urinate, worsening of blood in your urine, or if you develop any concerning symptoms such as: high fever not relieved by acetaminophen (Tylenol) and/or ibuprofen (Motrin / Advil), chills, shortness of breath, chest pain, feeling of your heart fluttering or racing, persistent nausea and/or vomiting, vomiting up blood, blood in your stool, loss of consciousness, numbness, weakness or tingling in the arms or legs or change in color of the extremities, changes in mental status, persistent headache, blurry vision, loss of bladder / bowel
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
## 2023-10-07 NOTE — ED TRIAGE NOTES
Pt arrives to ED from home for c/o urinary retention. Pt states he was discharged from inpatient yesterday evening after having an abdominal surgery. Pt states he had a catheter but it was removed on Tuesday. Pt states he has not urinated since 11am yesterday.

## 2023-10-07 NOTE — ED NOTES
Pt continues to sit in bed and talk with SO at bedside. Updated on plan of care. Voiced no needs. Call light in reach.      Pam Haynes RN  10/07/23 [id number]

## 2023-10-07 NOTE — ED NOTES
Pt in bed talking with SO at bedside. Updated on plan of care. Voiced no needs. Call light in reach.      Deborah Berrios, RN  10/07/23 [id number]

## 2023-10-11 ENCOUNTER — TELEPHONE (OUTPATIENT)
Dept: SURGERY | Age: 68
End: 2023-10-11

## 2023-10-11 NOTE — TELEPHONE ENCOUNTER
Spouse called and states the glue came off of part of the incision. It is seeping now. Spouse is worried about this. Pt is on an antibiotic. She will clean the wound w/the antibacterial soap daily and cover w/band-aid. Pt is due to see you on 10/17/23. It is not red or warm to touch.

## 2023-10-17 ENCOUNTER — OFFICE VISIT (OUTPATIENT)
Dept: SURGERY | Age: 68
End: 2023-10-17

## 2023-10-17 VITALS
TEMPERATURE: 97.6 F | DIASTOLIC BLOOD PRESSURE: 62 MMHG | WEIGHT: 156.9 LBS | HEART RATE: 85 BPM | HEIGHT: 67 IN | RESPIRATION RATE: 18 BRPM | BODY MASS INDEX: 24.63 KG/M2 | SYSTOLIC BLOOD PRESSURE: 118 MMHG | OXYGEN SATURATION: 97 %

## 2023-10-17 DIAGNOSIS — Z98.890 S/P EXPLORATORY LAPAROTOMY: Primary | ICD-10-CM

## 2023-10-17 PROCEDURE — 99024 POSTOP FOLLOW-UP VISIT: CPT | Performed by: NURSE PRACTITIONER

## 2023-10-19 LAB
BACTERIA SPEC AEROBE CULT: NORMAL
GRAM STN SPEC: NORMAL

## 2023-10-20 ASSESSMENT — ENCOUNTER SYMPTOMS
SHORTNESS OF BREATH: 0
ABDOMINAL PAIN: 0
VOMITING: 0
NAUSEA: 0
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
## 2023-10-26 ENCOUNTER — OFFICE VISIT (OUTPATIENT)
Dept: SURGERY | Age: 68
End: 2023-10-26

## 2023-10-26 VITALS
HEIGHT: 67 IN | HEART RATE: 65 BPM | TEMPERATURE: 97.5 F | BODY MASS INDEX: 24.64 KG/M2 | DIASTOLIC BLOOD PRESSURE: 68 MMHG | RESPIRATION RATE: 18 BRPM | SYSTOLIC BLOOD PRESSURE: 102 MMHG | WEIGHT: 156.97 LBS | OXYGEN SATURATION: 98 %

## 2023-10-26 DIAGNOSIS — Z98.890 S/P EXPLORATORY LAPAROTOMY: Primary | ICD-10-CM

## 2023-10-26 PROCEDURE — 99024 POSTOP FOLLOW-UP VISIT: CPT | Performed by: NURSE PRACTITIONER

## 2023-10-26 ASSESSMENT — ENCOUNTER SYMPTOMS
VOMITING: 0
ABDOMINAL PAIN: 0
NAUSEA: 0
SHORTNESS OF BREATH: 0

## 2023-10-26 NOTE — PROGRESS NOTES
Mental Status: He is alert and oriented to person, place, and time. Assessment/Plan:     Deon Yoo was seen today for post-op check. Diagnoses and all orders for this visit:    S/P exploratory laparotomy        Return in about 2 weeks (around 11/9/2023). Patient Instructions   Okay to resume normal activity on November 15    Discusseduse, benefit, and side effects of prescribed medications. All patient questionsanswered. Pt voiced understanding.      Electronically signed by SANTA Dent CNP on 10/26/2023 at 3:06 PM

## 2023-11-09 ENCOUNTER — OFFICE VISIT (OUTPATIENT)
Dept: SURGERY | Age: 68
End: 2023-11-09

## 2023-11-09 VITALS
BODY MASS INDEX: 24.71 KG/M2 | WEIGHT: 157.4 LBS | DIASTOLIC BLOOD PRESSURE: 62 MMHG | TEMPERATURE: 97.5 F | HEIGHT: 67 IN | SYSTOLIC BLOOD PRESSURE: 110 MMHG | OXYGEN SATURATION: 99 % | HEART RATE: 79 BPM

## 2023-11-09 DIAGNOSIS — Z98.890 S/P EXPLORATORY LAPAROTOMY: Primary | ICD-10-CM

## 2023-11-09 PROCEDURE — 99024 POSTOP FOLLOW-UP VISIT: CPT | Performed by: NURSE PRACTITIONER

## 2023-11-09 RX ORDER — MELATONIN 5 MG
2 TABLET,CHEWABLE ORAL
COMMUNITY

## 2023-11-09 RX ORDER — ZINC 25 MG
25 TABLET ORAL DAILY
COMMUNITY

## 2023-11-13 ASSESSMENT — ENCOUNTER SYMPTOMS
SHORTNESS OF BREATH: 0
VOMITING: 0
ABDOMINAL PAIN: 0
NAUSEA: 0

## 2023-12-05 ENCOUNTER — OFFICE VISIT (OUTPATIENT)
Dept: SURGERY | Age: 68
End: 2023-12-05

## 2023-12-05 VITALS
BODY MASS INDEX: 25.27 KG/M2 | SYSTOLIC BLOOD PRESSURE: 120 MMHG | OXYGEN SATURATION: 97 % | HEIGHT: 67 IN | RESPIRATION RATE: 18 BRPM | WEIGHT: 161 LBS | DIASTOLIC BLOOD PRESSURE: 80 MMHG | TEMPERATURE: 97.6 F | HEART RATE: 68 BPM

## 2023-12-05 DIAGNOSIS — Z98.890 S/P EXPLORATORY LAPAROTOMY: Primary | ICD-10-CM

## 2023-12-05 PROCEDURE — 99024 POSTOP FOLLOW-UP VISIT: CPT | Performed by: NURSE PRACTITIONER

## 2023-12-05 NOTE — PROGRESS NOTES
worsen or fail to improve. There are no Patient Instructions on file for this visit. Discusseduse, benefit, and side effects of prescribed medications. All patient questionsanswered. Pt voiced understanding.      Electronically signed by SANTA Sutton CNP on 12/6/2023 at 3:54 PM

## 2023-12-06 ASSESSMENT — ENCOUNTER SYMPTOMS
VOMITING: 0
NAUSEA: 0
SHORTNESS OF BREATH: 0
ABDOMINAL PAIN: 0

## 2024-01-31 ENCOUNTER — HOSPITAL ENCOUNTER (EMERGENCY)
Age: 69
Discharge: HOME | DRG: 920 | End: 2024-02-01
Attending: EMERGENCY MEDICINE
Payer: MEDICARE

## 2024-01-31 DIAGNOSIS — R33.9 URINARY RETENTION: Primary | ICD-10-CM

## 2024-01-31 ASSESSMENT — PAIN SCALES - GENERAL: PAINLEVEL_OUTOF10: 9

## 2024-01-31 ASSESSMENT — PAIN - FUNCTIONAL ASSESSMENT: PAIN_FUNCTIONAL_ASSESSMENT: 0-10

## 2024-01-31 ASSESSMENT — PAIN DESCRIPTION - LOCATION: LOCATION: ABDOMEN

## 2024-02-01 ENCOUNTER — HOSPITAL ENCOUNTER (INPATIENT)
Age: 69
LOS: 1 days | Discharge: HOME | DRG: 920 | End: 2024-02-03
Attending: STUDENT IN AN ORGANIZED HEALTH CARE EDUCATION/TRAINING PROGRAM | Admitting: NURSE PRACTITIONER
Payer: MEDICARE

## 2024-02-01 VITALS
HEART RATE: 87 BPM | DIASTOLIC BLOOD PRESSURE: 82 MMHG | BODY MASS INDEX: 23.96 KG/M2 | WEIGHT: 153 LBS | RESPIRATION RATE: 16 BRPM | SYSTOLIC BLOOD PRESSURE: 120 MMHG | OXYGEN SATURATION: 97 % | TEMPERATURE: 98.1 F

## 2024-02-01 DIAGNOSIS — R31.0 GROSS HEMATURIA: ICD-10-CM

## 2024-02-01 DIAGNOSIS — R33.8 ACUTE URINARY RETENTION: Primary | ICD-10-CM

## 2024-02-01 DIAGNOSIS — N39.0 COMPLICATED URINARY TRACT INFECTION: ICD-10-CM

## 2024-02-01 LAB
ALBUMIN SERPL BCG-MCNC: 4.2 G/DL (ref 3.5–5.1)
ALP SERPL-CCNC: 96 U/L (ref 38–126)
ALT SERPL W/O P-5'-P-CCNC: < 5 U/L (ref 11–66)
ANION GAP SERPL CALC-SCNC: 13 MEQ/L (ref 8–16)
ANION GAP SERPL CALC-SCNC: 9 MEQ/L (ref 8–16)
APTT PPP: 31.4 SECONDS (ref 22–38)
AST SERPL-CCNC: 15 U/L (ref 5–40)
BACTERIA URNS QL MICRO: ABNORMAL /HPF
BACTERIA: ABNORMAL
BASOPHILS ABSOLUTE: 0 THOU/MM3 (ref 0–0.1)
BASOPHILS ABSOLUTE: 0.1 THOU/MM3 (ref 0–0.1)
BASOPHILS NFR BLD AUTO: 0.4 %
BASOPHILS NFR BLD AUTO: 0.6 %
BILIRUB SERPL-MCNC: 0.4 MG/DL (ref 0.3–1.2)
BILIRUB UR QL STRIP.AUTO: ABNORMAL
BILIRUB UR QL STRIP: NEGATIVE
BUN SERPL-MCNC: 19 MG/DL (ref 7–22)
BUN SERPL-MCNC: 23 MG/DL (ref 7–22)
CALCIUM SERPL-MCNC: 9.2 MG/DL (ref 8.5–10.5)
CALCIUM SERPL-MCNC: 9.2 MG/DL (ref 8.5–10.5)
CASTS #/AREA URNS LPF: ABNORMAL /LPF
CASTS 2: ABNORMAL /LPF
CHARACTER UR: ABNORMAL
CHARACTER UR: ABNORMAL
CHARCOAL URNS QL MICRO: ABNORMAL
CHLORIDE SERPL-SCNC: 101 MEQ/L (ref 98–111)
CHLORIDE SERPL-SCNC: 105 MEQ/L (ref 98–111)
CO2 SERPL-SCNC: 25 MEQ/L (ref 23–33)
CO2 SERPL-SCNC: 26 MEQ/L (ref 23–33)
COLOR UR: ABNORMAL
COLOR: ABNORMAL
CREAT SERPL-MCNC: 0.9 MG/DL (ref 0.4–1.2)
CREAT SERPL-MCNC: 1 MG/DL (ref 0.4–1.2)
CRYSTALS URNS MICRO: ABNORMAL
CRYSTALS URNS QL MICRO: ABNORMAL
DEPRECATED RDW RBC AUTO: 43.5 FL (ref 35–45)
DEPRECATED RDW RBC AUTO: 44.1 FL (ref 35–45)
EOSINOPHIL NFR BLD AUTO: 1.3 %
EOSINOPHIL NFR BLD AUTO: 2.1 %
EOSINOPHILS ABSOLUTE: 0.1 THOU/MM3 (ref 0–0.4)
EOSINOPHILS ABSOLUTE: 0.2 THOU/MM3 (ref 0–0.4)
EPITHELIAL CELLS, UA: ABNORMAL /HPF
EPITHELIAL CELLS, UA: ABNORMAL /HPF
ERYTHROCYTE [DISTWIDTH] IN BLOOD BY AUTOMATED COUNT: 12.7 % (ref 11.5–14.5)
ERYTHROCYTE [DISTWIDTH] IN BLOOD BY AUTOMATED COUNT: 12.9 % (ref 11.5–14.5)
GFR SERPL CREATININE-BSD FRML MDRD: > 60 ML/MIN/1.73M2
GFR SERPL CREATININE-BSD FRML MDRD: > 60 ML/MIN/1.73M2
GLUCOSE SERPL-MCNC: 107 MG/DL (ref 70–108)
GLUCOSE SERPL-MCNC: 94 MG/DL (ref 70–108)
GLUCOSE UR QL STRIP.AUTO: NEGATIVE MG/DL
GLUCOSE UR QL STRIP.AUTO: NEGATIVE MG/DL
HCT VFR BLD AUTO: 44.4 % (ref 42–52)
HCT VFR BLD AUTO: 46 % (ref 42–52)
HGB BLD-MCNC: 14.7 GM/DL (ref 14–18)
HGB BLD-MCNC: 15.1 GM/DL (ref 14–18)
HGB UR QL STRIP.AUTO: ABNORMAL
HGB UR QL STRIP.AUTO: ABNORMAL
IMM GRANULOCYTES # BLD AUTO: 0.04 THOU/MM3 (ref 0–0.07)
IMM GRANULOCYTES # BLD AUTO: 0.05 THOU/MM3 (ref 0–0.07)
IMM GRANULOCYTES NFR BLD AUTO: 0.4 %
IMM GRANULOCYTES NFR BLD AUTO: 0.6 %
INR PPP: 0.98 (ref 0.85–1.13)
KETONES UR QL STRIP.AUTO: NEGATIVE
KETONES UR QL STRIP.AUTO: NEGATIVE
LEUKOCYTE ESTERASE UR QL STRIP.AUTO: NEGATIVE
LYMPHOCYTES ABSOLUTE: 1.2 THOU/MM3 (ref 1–4.8)
LYMPHOCYTES ABSOLUTE: 1.4 THOU/MM3 (ref 1–4.8)
LYMPHOCYTES NFR BLD AUTO: 12.7 %
LYMPHOCYTES NFR BLD AUTO: 16.5 %
MCH RBC QN AUTO: 30.8 PG (ref 26–33)
MCH RBC QN AUTO: 30.9 PG (ref 26–33)
MCHC RBC AUTO-ENTMCNC: 32.8 GM/DL (ref 32.2–35.5)
MCHC RBC AUTO-ENTMCNC: 33.1 GM/DL (ref 32.2–35.5)
MCV RBC AUTO: 93.1 FL (ref 80–94)
MCV RBC AUTO: 94.3 FL (ref 80–94)
MISCELLANEOUS 2: ABNORMAL
MONOCYTES ABSOLUTE: 0.9 THOU/MM3 (ref 0.4–1.3)
MONOCYTES ABSOLUTE: 0.9 THOU/MM3 (ref 0.4–1.3)
MONOCYTES NFR BLD AUTO: 10.2 %
MONOCYTES NFR BLD AUTO: 10.6 %
NEUTROPHILS NFR BLD AUTO: 69.6 %
NEUTROPHILS NFR BLD AUTO: 75 %
NITRITE UR QL STRIP.AUTO: NEGATIVE
NITRITE UR QL STRIP: POSITIVE
NRBC BLD AUTO-RTO: 0 /100 WBC
NRBC BLD AUTO-RTO: 0 /100 WBC
OSMOLALITY SERPL CALC.SUM OF ELEC: 281.4 MOSMOL/KG (ref 275–300)
OSMOLALITY SERPL CALC.SUM OF ELEC: 281.7 MOSMOL/KG (ref 275–300)
PH UR STRIP.AUTO: 5 [PH] (ref 5–9)
PH UR STRIP.AUTO: 6.5 [PH] (ref 5–9)
PLATELET # BLD AUTO: 232 THOU/MM3 (ref 130–400)
PLATELET # BLD AUTO: 246 THOU/MM3 (ref 130–400)
PMV BLD AUTO: 10 FL (ref 9.4–12.4)
PMV BLD AUTO: 10.3 FL (ref 9.4–12.4)
POTASSIUM SERPL-SCNC: 4 MEQ/L (ref 3.5–5.2)
POTASSIUM SERPL-SCNC: 4.1 MEQ/L (ref 3.5–5.2)
PROCALCITONIN SERPL IA-MCNC: 0.02 NG/ML (ref 0.01–0.09)
PROT SERPL-MCNC: 6.6 G/DL (ref 6.1–8)
PROT UR STRIP.AUTO-MCNC: 100 MG/DL
PROT UR STRIP.AUTO-MCNC: 30 MG/DL
RBC # BLD AUTO: 4.77 MILL/MM3 (ref 4.7–6.1)
RBC # BLD AUTO: 4.88 MILL/MM3 (ref 4.7–6.1)
RBC #/AREA URNS HPF: > 200 /HPF
RBC URINE: > 200 /HPF
RENAL EPI CELLS #/AREA URNS HPF: ABNORMAL /[HPF]
RENAL EPI CELLS #/AREA URNS HPF: ABNORMAL /[HPF]
SEGMENTED NEUTROPHILS ABSOLUTE COUNT: 5.8 THOU/MM3 (ref 1.8–7.7)
SEGMENTED NEUTROPHILS ABSOLUTE COUNT: 6.8 THOU/MM3 (ref 1.8–7.7)
SODIUM SERPL-SCNC: 139 MEQ/L (ref 135–145)
SODIUM SERPL-SCNC: 140 MEQ/L (ref 135–145)
SP GR UR REFRACT.AUTO: 1.02 (ref 1–1.03)
SPECIFIC GRAVITY UA: 1.02 (ref 1–1.03)
UROBILINOGEN, URINE: 0.2 EU/DL (ref 0–1)
UROBILINOGEN, URINE: 0.2 EU/DL (ref 0–1)
WBC # BLD AUTO: 8.4 THOU/MM3 (ref 4.8–10.8)
WBC # BLD AUTO: 9.1 THOU/MM3 (ref 4.8–10.8)
WBC #/AREA URNS HPF: ABNORMAL /HPF
WBC #/AREA URNS HPF: ABNORMAL /HPF
WBC #/AREA URNS HPF: ABNORMAL /[HPF]
YEAST LIKE FUNGI URNS QL MICRO: ABNORMAL
YEAST LIKE FUNGI URNS QL MICRO: ABNORMAL

## 2024-02-01 PROCEDURE — 51702 INSERT TEMP BLADDER CATH: CPT

## 2024-02-01 PROCEDURE — 2580000003 HC RX 258: Performed by: NURSE PRACTITIONER

## 2024-02-01 PROCEDURE — 99285 EMERGENCY DEPT VISIT HI MDM: CPT

## 2024-02-01 PROCEDURE — 87040 BLOOD CULTURE FOR BACTERIA: CPT

## 2024-02-01 PROCEDURE — G0378 HOSPITAL OBSERVATION PER HR: HCPCS

## 2024-02-01 PROCEDURE — 2580000003 HC RX 258: Performed by: STUDENT IN AN ORGANIZED HEALTH CARE EDUCATION/TRAINING PROGRAM

## 2024-02-01 PROCEDURE — 81001 URINALYSIS AUTO W/SCOPE: CPT

## 2024-02-01 PROCEDURE — 80053 COMPREHEN METABOLIC PANEL: CPT

## 2024-02-01 PROCEDURE — 96365 THER/PROPH/DIAG IV INF INIT: CPT

## 2024-02-01 PROCEDURE — 36415 COLL VENOUS BLD VENIPUNCTURE: CPT

## 2024-02-01 PROCEDURE — 6370000000 HC RX 637 (ALT 250 FOR IP): Performed by: STUDENT IN AN ORGANIZED HEALTH CARE EDUCATION/TRAINING PROGRAM

## 2024-02-01 PROCEDURE — 85025 COMPLETE CBC W/AUTO DIFF WBC: CPT

## 2024-02-01 PROCEDURE — 6360000002 HC RX W HCPCS: Performed by: STUDENT IN AN ORGANIZED HEALTH CARE EDUCATION/TRAINING PROGRAM

## 2024-02-01 PROCEDURE — 85730 THROMBOPLASTIN TIME PARTIAL: CPT

## 2024-02-01 PROCEDURE — 84145 PROCALCITONIN (PCT): CPT

## 2024-02-01 PROCEDURE — 87086 URINE CULTURE/COLONY COUNT: CPT

## 2024-02-01 PROCEDURE — 85610 PROTHROMBIN TIME: CPT

## 2024-02-01 PROCEDURE — 6370000000 HC RX 637 (ALT 250 FOR IP): Performed by: NURSE PRACTITIONER

## 2024-02-01 PROCEDURE — 99223 1ST HOSP IP/OBS HIGH 75: CPT | Performed by: NURSE PRACTITIONER

## 2024-02-01 RX ORDER — POTASSIUM CHLORIDE 7.45 MG/ML
10 INJECTION INTRAVENOUS PRN
Status: DISCONTINUED | OUTPATIENT
Start: 2024-02-01 | End: 2024-02-03 | Stop reason: HOSPADM

## 2024-02-01 RX ORDER — DOCUSATE SODIUM 100 MG/1
100 CAPSULE, LIQUID FILLED ORAL DAILY
Status: DISCONTINUED | OUTPATIENT
Start: 2024-02-01 | End: 2024-02-03 | Stop reason: HOSPADM

## 2024-02-01 RX ORDER — SODIUM CHLORIDE 9 MG/ML
INJECTION, SOLUTION INTRAVENOUS PRN
Status: DISCONTINUED | OUTPATIENT
Start: 2024-02-01 | End: 2024-02-03 | Stop reason: HOSPADM

## 2024-02-01 RX ORDER — ASCORBIC ACID 500 MG
500 TABLET ORAL DAILY
Status: DISCONTINUED | OUTPATIENT
Start: 2024-02-02 | End: 2024-02-03 | Stop reason: HOSPADM

## 2024-02-01 RX ORDER — ACETAMINOPHEN 325 MG/1
650 TABLET ORAL EVERY 6 HOURS PRN
Status: DISCONTINUED | OUTPATIENT
Start: 2024-02-01 | End: 2024-02-03 | Stop reason: HOSPADM

## 2024-02-01 RX ORDER — SODIUM CHLORIDE 0.9 % (FLUSH) 0.9 %
5-40 SYRINGE (ML) INJECTION PRN
Status: DISCONTINUED | OUTPATIENT
Start: 2024-02-01 | End: 2024-02-03 | Stop reason: HOSPADM

## 2024-02-01 RX ORDER — ZINC SULFATE 50(220)MG
50 CAPSULE ORAL DAILY
Status: DISCONTINUED | OUTPATIENT
Start: 2024-02-02 | End: 2024-02-03 | Stop reason: HOSPADM

## 2024-02-01 RX ORDER — ONDANSETRON 2 MG/ML
4 INJECTION INTRAMUSCULAR; INTRAVENOUS EVERY 6 HOURS PRN
Status: DISCONTINUED | OUTPATIENT
Start: 2024-02-01 | End: 2024-02-03 | Stop reason: HOSPADM

## 2024-02-01 RX ORDER — POTASSIUM CHLORIDE 20 MEQ/1
40 TABLET, EXTENDED RELEASE ORAL PRN
Status: DISCONTINUED | OUTPATIENT
Start: 2024-02-01 | End: 2024-02-03 | Stop reason: HOSPADM

## 2024-02-01 RX ORDER — ZINC SULFATE 50(220)MG
50 CAPSULE ORAL DAILY
Status: DISCONTINUED | OUTPATIENT
Start: 2024-02-01 | End: 2024-02-01

## 2024-02-01 RX ORDER — ACETAMINOPHEN 650 MG/1
650 SUPPOSITORY RECTAL EVERY 6 HOURS PRN
Status: DISCONTINUED | OUTPATIENT
Start: 2024-02-01 | End: 2024-02-03 | Stop reason: HOSPADM

## 2024-02-01 RX ORDER — FLUVASTATIN 40 MG/1
40 CAPSULE ORAL NIGHTLY
Status: DISCONTINUED | OUTPATIENT
Start: 2024-02-01 | End: 2024-02-03 | Stop reason: HOSPADM

## 2024-02-01 RX ORDER — MAGNESIUM SULFATE IN WATER 40 MG/ML
2000 INJECTION, SOLUTION INTRAVENOUS PRN
Status: DISCONTINUED | OUTPATIENT
Start: 2024-02-01 | End: 2024-02-03 | Stop reason: HOSPADM

## 2024-02-01 RX ORDER — SODIUM CHLORIDE 0.9 % (FLUSH) 0.9 %
5-40 SYRINGE (ML) INJECTION EVERY 12 HOURS SCHEDULED
Status: DISCONTINUED | OUTPATIENT
Start: 2024-02-01 | End: 2024-02-03 | Stop reason: HOSPADM

## 2024-02-01 RX ORDER — ZINC 25 MG
25 TABLET ORAL DAILY
Status: DISCONTINUED | OUTPATIENT
Start: 2024-02-01 | End: 2024-02-01 | Stop reason: SDUPTHER

## 2024-02-01 RX ORDER — POLYETHYLENE GLYCOL 3350 17 G/17G
17 POWDER, FOR SOLUTION ORAL DAILY PRN
Status: DISCONTINUED | OUTPATIENT
Start: 2024-02-01 | End: 2024-02-03 | Stop reason: HOSPADM

## 2024-02-01 RX ORDER — ONDANSETRON 4 MG/1
4 TABLET, ORALLY DISINTEGRATING ORAL EVERY 8 HOURS PRN
Status: DISCONTINUED | OUTPATIENT
Start: 2024-02-01 | End: 2024-02-03 | Stop reason: HOSPADM

## 2024-02-01 RX ORDER — TAMSULOSIN HYDROCHLORIDE 0.4 MG/1
0.4 CAPSULE ORAL DAILY
Status: DISCONTINUED | OUTPATIENT
Start: 2024-02-01 | End: 2024-02-03 | Stop reason: HOSPADM

## 2024-02-01 RX ORDER — ATORVASTATIN CALCIUM 10 MG/1
10 TABLET, FILM COATED ORAL DAILY
Status: DISCONTINUED | OUTPATIENT
Start: 2024-02-01 | End: 2024-02-01

## 2024-02-01 RX ORDER — TAMSULOSIN HYDROCHLORIDE 0.4 MG/1
0.4 CAPSULE ORAL DAILY
Status: DISCONTINUED | OUTPATIENT
Start: 2024-02-01 | End: 2024-02-01

## 2024-02-01 RX ORDER — LIDOCAINE HYDROCHLORIDE 20 MG/ML
JELLY TOPICAL PRN
Status: DISCONTINUED | OUTPATIENT
Start: 2024-02-01 | End: 2024-02-03 | Stop reason: HOSPADM

## 2024-02-01 RX ADMIN — SODIUM CHLORIDE, PRESERVATIVE FREE 10 ML: 5 INJECTION INTRAVENOUS at 20:35

## 2024-02-01 RX ADMIN — FLUVASTATIN 40 MG: 40 CAPSULE ORAL at 20:37

## 2024-02-01 RX ADMIN — DOCUSATE SODIUM 100 MG: 100 CAPSULE, LIQUID FILLED ORAL at 20:34

## 2024-02-01 RX ADMIN — LIDOCAINE HYDROCHLORIDE: 20 JELLY TOPICAL at 12:33

## 2024-02-01 RX ADMIN — CEFTRIAXONE SODIUM 1000 MG: 1 INJECTION, POWDER, FOR SOLUTION INTRAMUSCULAR; INTRAVENOUS at 15:06

## 2024-02-01 RX ADMIN — TAMSULOSIN HYDROCHLORIDE 0.4 MG: 0.4 CAPSULE ORAL at 20:37

## 2024-02-01 ASSESSMENT — PAIN - FUNCTIONAL ASSESSMENT
PAIN_FUNCTIONAL_ASSESSMENT: NONE - DENIES PAIN

## 2024-02-01 NOTE — H&P
History & Physical    Patient:  Onofre Toledo  YOB: 1955  Date of Service: 2/1/2024  MRN: [id number]   Acct:  [id number]   Primary Care Physician: Dereje Westbrook DO    Chief Complaint: Blood clots in catheter    Assessment / Plan:    Gross hematuria with clots and urinary retention. Recent green light laser to his prostate approximately 2 weeks ago. Wesley was removed following procedure. Sent home. Started to have hematuria. Presented to the ED here 1/31. Found to be in urinary retention. Wesley inserted. Represented today with reports of blood  clots, blockage and leakage around tube. Resolution of blockage with hand irrigation. Subsequent CBI has been set up. Primary urologist from [facility] was notified. Transfer accepted, however, no beds are available. He was been put on the waiting list. Will place in observation. Continue CBI and home Flomax. SCDs for DVT prophylaxis. HH stable.  Repeat in AM. Consult urology for additional recommendations.    UTI(POA). UA positive nitrite, Large leukocyte, WBC 15-25. Continue IV Rocephin started in ED. Culture has been set up.   Constipation. Start Colace. Miralax PRN.  Parkinsons disease. Resume home medications.          History of Present Illness:   History obtained from chart review and the patient.    The patient is a 68 y.o. male who presents with blood clots in wesley catheter. green light laser to his prostate approximately 2 weeks ago  Wesley was removed following procedure. Sent home. Started to have hematuria. Presented to the ED here 1/31 unable to void. Found to be in urinary retention with 700 ml out. Wesley inserted. Represented today with reports of blood clots, blockage and leakage around tube. Resolution of blockage with hand irrigation. Subsequent CBI has been set up. Primary urologist from [facility] was notified. Transfer accepted, however, no beds are available. He was been put on the waiting list.  1.0 - 4.8 thou/mm3    Monocytes Absolute 0.9 0.4 - 1.3 thou/mm3    Eosinophils Absolute 0.2 0.0 - 0.4 thou/mm3    Basophils Absolute 0.1 0.0 - 0.1 thou/mm3    Immature Grans (Abs) 0.05 0.00 - 0.07 thou/mm3    nRBC 0 /100 wbc   Anion Gap    Collection Time: 02/01/24 12:17 AM   Result Value Ref Range    Anion Gap 13.0 8.0 - 16.0 meq/L   Osmolality    Collection Time: 02/01/24 12:17 AM   Result Value Ref Range    Osmolality Calc 281.7 275.0 - 300.0 mOsmol/kg   Glomerular Filtration Rate, Estimated    Collection Time: 02/01/24 12:17 AM   Result Value Ref Range    Est, Glom Filt Rate >60 >60 ml/min/1.73m2   Urine with Reflexed Micro    Collection Time: 02/01/24  1:14 PM   Result Value Ref Range    Glucose, Ur NEGATIVE NEGATIVE mg/dl    Bilirubin Urine LARGE (A) NEGATIVE    Ketones, Urine NEGATIVE NEGATIVE    Specific Gravity, Urine 1.018 1.002 - 1.030    Blood, Urine SMALL (A) NEGATIVE    pH, UA 5.0 5.0 - 9.0    Protein, UA 30 (A) NEGATIVE    Urobilinogen, Urine 0.2 0.0 - 1.0 eu/dl    Nitrite, Urine POSITIVE (A) NEGATIVE    Leukocyte Esterase, Urine LARGE (A) NEGATIVE    Color, UA RED (A) STRAW-YELLOW    Character, Urine TURBID (A) CLEAR-SL CLOUD    RBC, UA > 200 0-2/hpf /hpf    WBC, UA 15-25 0-4/hpf /hpf    Epithelial Cells, UA 3-5 3-5/hpf /hpf    Bacteria, UA NONE SEEN FEW/NONE SEEN /hpf    Casts UA 4-8 HYALINE NONE SEEN /lpf    Crystals, UA NONE SEEN NONE SEEN    Renal Epithelial, UA NONE SEEN NONE SEEN    Yeast, UA NONE SEEN NONE SEEN    CASTS 2 NONE SEEN NONE SEEN /lpf    MISCELLANEOUS 2 NONE SEEN    CBC with Auto Differential    Collection Time: 02/01/24  2:46 PM   Result Value Ref Range    WBC 9.1 4.8 - 10.8 thou/mm3    RBC 4.88 4.70 - 6.10 mill/mm3    Hemoglobin 15.1 14.0 - 18.0 gm/dl    Hematocrit 46.0 42.0 - 52.0 %    MCV 94.3 (H) 80.0 - 94.0 fL    MCH 30.9 26.0 - 33.0 pg    MCHC 32.8 32.2 - 35.5 gm/dl    RDW-CV 12.9 11.5 - 14.5 %    RDW-SD 44.1 35.0 - 45.0 fL    Platelets 232 130 - 400 thou/mm3    MPV 10.0 9.4  - 12.4 fL    Seg Neutrophils 75.0 %    Lymphocytes 12.7 %    Monocytes 10.2 %    Eosinophils 1.3 %    Basophils 0.4 %    Immature Granulocytes 0.4 %    Segs Absolute 6.8 1.8 - 7.7 thou/mm3    Lymphocytes Absolute 1.2 1.0 - 4.8 thou/mm3    Monocytes Absolute 0.9 0.4 - 1.3 thou/mm3    Eosinophils Absolute 0.1 0.0 - 0.4 thou/mm3    Basophils Absolute 0.0 0.0 - 0.1 thou/mm3    Immature Grans (Abs) 0.04 0.00 - 0.07 thou/mm3    nRBC 0 /100 wbc   CMP    Collection Time: 02/01/24  2:46 PM   Result Value Ref Range    Glucose 94 70 - 108 mg/dL    Creatinine 0.9 0.4 - 1.2 mg/dL    BUN 19 7 - 22 mg/dL    Sodium 140 135 - 145 meq/L    Potassium 4.0 3.5 - 5.2 meq/L    Chloride 105 98 - 111 meq/L    CO2 26 23 - 33 meq/L    Calcium 9.2 8.5 - 10.5 mg/dL    AST 15 5 - 40 U/L    Alkaline Phosphatase 96 38 - 126 U/L    Total Protein 6.6 6.1 - 8.0 g/dL    Albumin 4.2 3.5 - 5.1 g/dL    Total Bilirubin 0.4 0.3 - 1.2 mg/dL    ALT <5 (L) 11 - 66 U/L   Procalcitonin    Collection Time: 02/01/24  2:46 PM   Result Value Ref Range    Procalcitonin 0.02 0.01 - 0.09 ng/mL   Protime-INR    Collection Time: 02/01/24  2:46 PM   Result Value Ref Range    INR 0.98 0.85 - 1.13   APTT    Collection Time: 02/01/24  2:46 PM   Result Value Ref Range    aPTT 31.4 22.0 - 38.0 seconds   Anion Gap    Collection Time: 02/01/24  2:46 PM   Result Value Ref Range    Anion Gap 9.0 8.0 - 16.0 meq/L   Glomerular Filtration Rate, Estimated    Collection Time: 02/01/24  2:46 PM   Result Value Ref Range    Est, Glom Filt Rate >60 >60 ml/min/1.73m2   Osmolality    Collection Time: 02/01/24  2:46 PM   Result Value Ref Range    Osmolality Calc 281.4 275.0 - 300.0 mOsmol/kg       Radiology:     No results found.      DVT prophylaxis: [] Lovenox                                 [x] SCDs                                 [] SQ Heparin                                 [] Encourage ambulation, low risk for DVT, no chemical or mechanical prophylaxis necessary              [] Already

## 2024-02-01 NOTE — ED PROVIDER NOTES
[facility]  EMERGENCY DEPARTMENT ENCOUNTER      Patient Name: Onofre Toledo  MRN: [id number]  YOB: 1955  Date of Evaluation: 2/1/2024  Emergency Physician: Monty Gonzales MD    CHIEF COMPLAINT       Chief Complaint   Patient presents with    Urinary Catheter       HISTORY OF PRESENT ILLNESS    HPI    History obtained from chart review and the patient.    Onofre Toledo is a 68 y.o. male who presents to the emergency department from home as a walk in to the ED lob for evaluation of urinary retention.  Patient recently had greenlight laser ablation to his prostate by Dr. Leonel Sotelo about 2 weeks ago [facility] in [place].  Yesterday he was seen in the emergency department for urinary retention and a urinary catheter was placed.  Reports this had been working until this morning when this stopped outflow did have blood with clots in his bag.  Reports that around 11 felt the urge to urinate and had some urine come around the catheter out of his penis.  No fever, no chills, not on any anticoagulation.  Reports that they discussed this with urology office yesterday and they advised him to come back to the emergency department if his symptoms got worse.  In the past he has required a urinary catheter after exploratory laparotomy and fall at 2023.      Pertinent previous and/or external records reviewed:  Reviewed prior ED visits, unable to see notes from urology office    REVIEW OF SYSTEMS   Review of Systems  Negative unless documented in HPI    PAST MEDICAL AND SURGICAL HISTORY     Past Medical History:   Diagnosis Date    Elevated PSA     2014    High blood sugar     History of ulcer disease     as child    Parkinson's disease 04/2017    Urinary retention 01/01/2013       Past Surgical History:   Procedure Laterality Date    APPENDECTOMY  1963    LAPAROTOMY N/A 9/30/2023    LAPAROTOMY EXPLORATORY. RELEASE OF ADHESIONS AND REDUCTION OF INTERNAL HERNIA  toxic-appearing or diaphoretic.   HENT:      Head: Normocephalic and atraumatic.      Nose: Nose normal.      Mouth/Throat:      Mouth: Mucous membranes are moist.      Pharynx: Oropharynx is clear.   Eyes:      Conjunctiva/sclera: Conjunctivae normal.   Cardiovascular:      Rate and Rhythm: Normal rate and regular rhythm.      Pulses: Normal pulses.      Heart sounds: Normal heart sounds.   Pulmonary:      Effort: Pulmonary effort is normal.      Breath sounds: Normal breath sounds.   Abdominal:      General: Abdomen is flat. Bowel sounds are normal.      Palpations: Abdomen is soft.      Tenderness: There is abdominal tenderness in the suprapubic area.   Musculoskeletal:      Cervical back: Normal range of motion.   Skin:     General: Skin is warm and dry.      Capillary Refill: Capillary refill takes less than 2 seconds.   Neurological:      Mental Status: He is alert and oriented to person, place, and time.            FORMAL DIAGNOSTIC RESULTS   RADIOLOGY: Interpretation per the Radiologist below, if available at the time of this note (none if blank):  No orders to display       LABS: (none if blank)  Labs Reviewed   URINE WITH REFLEXED MICRO - Abnormal; Notable for the following components:       Result Value    Bilirubin Urine LARGE (*)     Blood, Urine SMALL (*)     Protein, UA 30 (*)     Nitrite, Urine POSITIVE (*)     Leukocyte Esterase, Urine LARGE (*)     Color, UA RED (*)     Character, Urine TURBID (*)     All other components within normal limits   CBC WITH AUTO DIFFERENTIAL - Abnormal; Notable for the following components:    MCV 94.3 (*)     All other components within normal limits   COMPREHENSIVE METABOLIC PANEL - Abnormal; Notable for the following components:    ALT <5 (*)     All other components within normal limits   CULTURE, URINE   CULTURE, BLOOD 1   CULTURE, BLOOD 1   PROCALCITONIN   PROTIME-INR   APTT   ANION GAP   GLOMERULAR FILTRATION RATE, ESTIMATED   OSMOLALITY     (Any cultures that  may have been sent were not resulted at the time of this patient visit. A negative COVID-19 test should be interpreted as COVID no longer suspected unless otherwise noted in this encounter documentation note)    MEDICAL DECISION MAKING   Initial Differential: Includes but is not limited to gross hematuria with clots causing obstruction, urinary tract infection, catheter malfunction, benign prostatic hypertrophy    Discrepancies:  None    Summary: This is a pleasant 68-year-old gentleman with urinary retention secondary to gross hematuria with clots.  He was seen in the emergency department yesterday for urinary retention and a urinary catheter was placed.  This morning this clotted off, stopped having urine output this morning.  We attempted to flush this catheter and this did not work we therefore replaced it with a 2 a catheter and started continuous bladder irrigation which resulted in numerous clots and gross hematuria that is starting to clear up with additional bladder irrigation.  Patient's urologist is based out of [place] and has privileges at [facility] given that this is likely a complication from the greenlight laser patient has been accepted for transfer at [facility] however they do not have any beds available.  We therefore have consulted our hospital service here at [facility] for admission waiting for transfer.  Repeat urinalysis on new urinary catheter this morning showed nitrates which is concerning for infection therefore started patient on ceftriaxone, repeat labs are otherwise unremarkable.      Medical Decision Making  Problems Addressed:  Acute urinary retention: undiagnosed new problem with uncertain prognosis  Complicated urinary tract infection: undiagnosed new problem with uncertain prognosis  Gross hematuria: undiagnosed new problem with uncertain prognosis    Amount and/or Complexity of Data Reviewed  External Data Reviewed: notes.  Labs: ordered.

## 2024-02-01 NOTE — ED NOTES
Attempted to manually irrigate cathter at this time without success. Bladder scan reading approx 100 ml urine in bladder.

## 2024-02-01 NOTE — ED TRIAGE NOTES
Pt had green light laser procedure on 1/18. Pt now c/o low abdominal pain he rates 9/10. Pt denies taking anything for his pain prior to coming in. Pt reports having blood clots in urine earlier today, and last time he urinated was at 2000. Pt denies nausea or emesis.

## 2024-02-01 NOTE — ED TRIAGE NOTES
Pt presents to the ED through triage with c/c clogged cathter. Pt reports that he is \"leaking around the outside.\"

## 2024-02-01 NOTE — ED PROVIDER NOTES

## 2024-02-01 NOTE — ED NOTES
Provided pt with meal tray. Voices no other needs or concerns. Vitals stable. Call light in reach. Wife at bedside.

## 2024-02-01 NOTE — ED NOTES
ED to inpatient nurses report      Chief Complaint:  Chief Complaint   Patient presents with    Urinary Catheter     Present to ED from: Home    MOA:     LOC: alert and orientated to name, place, date  Mobility: Independent  Oxygen Baseline: room air     Current needs required: none     Code Status:   Prior    What abnormal results were found and what did you give/do to treat them? Urinary retention, CBI initiated.   Any procedures or intervention occur? CBI    Mental Status:  Level of Consciousness: Alert (0)    Psych Assessment:        Vitals:  Patient Vitals for the past 24 hrs:   BP Temp Temp src Pulse Resp SpO2 Height Weight   02/01/24 1630 117/75 -- -- -- -- 96 % -- --   02/01/24 1548 123/73 -- -- 80 16 98 % -- --   02/01/24 1510 131/86 -- -- 76 18 98 % -- --   02/01/24 1404 116/60 -- -- 69 16 98 % -- --   02/01/24 1350 114/77 -- -- 69 16 97 % -- --   02/01/24 1237 111/75 -- -- 81 16 95 % -- --   02/01/24 1200 (!) 140/88 97.8 °F (36.6 °C) Oral 84 16 96 % 1.702 m (5' 7\") 69.4 kg (153 lb)        LDAs:   Peripheral IV 02/01/24 Left Arm (Active)   Site Assessment Clean, dry & intact 02/01/24 1511   Phlebitis Assessment No symptoms 02/01/24 1511   Infiltration Assessment 0 02/01/24 1511       Ambulatory Status:  No data recorded    Diagnosis:  DISPOSITION Admitted 02/01/2024 06:00:52 PM   Final diagnoses:   Acute urinary retention   Gross hematuria   Complicated urinary tract infection        Consults:  IP CONSULT TO UROLOGY     Pain Score:  Pain Assessment  Pain Assessment: None - Denies Pain    C-SSRS:   Risk of Suicide: No Risk    Sepsis Screening:  Sepsis Risk Score: 0.63    [place] Fall Risk:       Swallow Screening        Preferred Language:   English      ALLERGIES     Patient has no known allergies.    SURGICAL HISTORY       Past Surgical History:   Procedure Laterality Date    APPENDECTOMY  1963    LAPAROTOMY N/A 9/30/2023    LAPAROTOMY EXPLORATORY. RELEASE OF ADHESIONS AND REDUCTION OF INTERNAL HERNIA

## 2024-02-02 ENCOUNTER — APPOINTMENT (OUTPATIENT)
Dept: CT IMAGING | Age: 69
DRG: 920 | End: 2024-02-02
Payer: MEDICARE

## 2024-02-02 PROBLEM — N30.01 ACUTE CYSTITIS WITH HEMATURIA: Status: ACTIVE | Noted: 2024-02-02

## 2024-02-02 PROBLEM — R33.9 URINARY RETENTION: Status: ACTIVE | Noted: 2024-02-02

## 2024-02-02 LAB
25(OH)D3 SERPL-MCNC: 42 NG/ML (ref 30–100)
ANION GAP SERPL CALC-SCNC: 12 MEQ/L (ref 8–16)
BASOPHILS ABSOLUTE: 0 THOU/MM3 (ref 0–0.1)
BASOPHILS NFR BLD AUTO: 0.5 %
BUN SERPL-MCNC: 17 MG/DL (ref 7–22)
CALCIUM SERPL-MCNC: 9.3 MG/DL (ref 8.5–10.5)
CHLORIDE SERPL-SCNC: 104 MEQ/L (ref 98–111)
CO2 SERPL-SCNC: 23 MEQ/L (ref 23–33)
CREAT SERPL-MCNC: 0.9 MG/DL (ref 0.4–1.2)
DEPRECATED RDW RBC AUTO: 43.6 FL (ref 35–45)
EOSINOPHIL NFR BLD AUTO: 1.8 %
EOSINOPHILS ABSOLUTE: 0.1 THOU/MM3 (ref 0–0.4)
ERYTHROCYTE [DISTWIDTH] IN BLOOD BY AUTOMATED COUNT: 12.8 % (ref 11.5–14.5)
GFR SERPL CREATININE-BSD FRML MDRD: > 60 ML/MIN/1.73M2
GLUCOSE SERPL-MCNC: 96 MG/DL (ref 70–108)
HCT VFR BLD AUTO: 45.6 % (ref 42–52)
HGB BLD-MCNC: 15.4 GM/DL (ref 14–18)
IMM GRANULOCYTES # BLD AUTO: 0.02 THOU/MM3 (ref 0–0.07)
IMM GRANULOCYTES NFR BLD AUTO: 0.3 %
LYMPHOCYTES ABSOLUTE: 1.2 THOU/MM3 (ref 1–4.8)
LYMPHOCYTES NFR BLD AUTO: 15.3 %
MCH RBC QN AUTO: 31.3 PG (ref 26–33)
MCHC RBC AUTO-ENTMCNC: 33.8 GM/DL (ref 32.2–35.5)
MCV RBC AUTO: 92.7 FL (ref 80–94)
MONOCYTES ABSOLUTE: 0.7 THOU/MM3 (ref 0.4–1.3)
MONOCYTES NFR BLD AUTO: 9.6 %
NEUTROPHILS NFR BLD AUTO: 72.5 %
NRBC BLD AUTO-RTO: 0 /100 WBC
OSMOLALITY SERPL CALC.SUM OF ELEC: 278.9 MOSMOL/KG (ref 275–300)
PLATELET # BLD AUTO: 229 THOU/MM3 (ref 130–400)
PMV BLD AUTO: 10.1 FL (ref 9.4–12.4)
POTASSIUM SERPL-SCNC: 4.1 MEQ/L (ref 3.5–5.2)
RBC # BLD AUTO: 4.92 MILL/MM3 (ref 4.7–6.1)
SEGMENTED NEUTROPHILS ABSOLUTE COUNT: 5.7 THOU/MM3 (ref 1.8–7.7)
SODIUM SERPL-SCNC: 139 MEQ/L (ref 135–145)
WBC # BLD AUTO: 7.8 THOU/MM3 (ref 4.8–10.8)

## 2024-02-02 PROCEDURE — 2580000003 HC RX 258: Performed by: NURSE PRACTITIONER

## 2024-02-02 PROCEDURE — 74176 CT ABD & PELVIS W/O CONTRAST: CPT

## 2024-02-02 PROCEDURE — 96366 THER/PROPH/DIAG IV INF ADDON: CPT

## 2024-02-02 PROCEDURE — 99221 1ST HOSP IP/OBS SF/LOW 40: CPT | Performed by: UROLOGY

## 2024-02-02 PROCEDURE — G0378 HOSPITAL OBSERVATION PER HR: HCPCS

## 2024-02-02 PROCEDURE — 82306 VITAMIN D 25 HYDROXY: CPT

## 2024-02-02 PROCEDURE — 6360000002 HC RX W HCPCS: Performed by: NURSE PRACTITIONER

## 2024-02-02 PROCEDURE — 80048 BASIC METABOLIC PNL TOTAL CA: CPT

## 2024-02-02 PROCEDURE — 36415 COLL VENOUS BLD VENIPUNCTURE: CPT

## 2024-02-02 PROCEDURE — 85025 COMPLETE CBC W/AUTO DIFF WBC: CPT

## 2024-02-02 PROCEDURE — 99232 SBSQ HOSP IP/OBS MODERATE 35: CPT | Performed by: NURSE PRACTITIONER

## 2024-02-02 PROCEDURE — 6370000000 HC RX 637 (ALT 250 FOR IP): Performed by: NURSE PRACTITIONER

## 2024-02-02 RX ADMIN — TAMSULOSIN HYDROCHLORIDE 0.4 MG: 0.4 CAPSULE ORAL at 20:20

## 2024-02-02 RX ADMIN — DOCUSATE SODIUM 100 MG: 100 CAPSULE, LIQUID FILLED ORAL at 07:50

## 2024-02-02 RX ADMIN — FLUVASTATIN 40 MG: 40 CAPSULE ORAL at 20:19

## 2024-02-02 RX ADMIN — Medication 3 TABLET: at 07:44

## 2024-02-02 RX ADMIN — CEFTRIAXONE SODIUM 1000 MG: 1 INJECTION, POWDER, FOR SOLUTION INTRAMUSCULAR; INTRAVENOUS at 14:32

## 2024-02-02 RX ADMIN — POLYETHYLENE GLYCOL 3350 17 G: 17 POWDER, FOR SOLUTION ORAL at 17:11

## 2024-02-02 RX ADMIN — Medication 3 TABLET: at 11:56

## 2024-02-02 RX ADMIN — Medication 3 TABLET: at 18:55

## 2024-02-02 RX ADMIN — SODIUM CHLORIDE, PRESERVATIVE FREE 10 ML: 5 INJECTION INTRAVENOUS at 14:35

## 2024-02-02 RX ADMIN — SODIUM CHLORIDE, PRESERVATIVE FREE 10 ML: 5 INJECTION INTRAVENOUS at 20:21

## 2024-02-02 NOTE — ED PROVIDER NOTES
[facility] EMERGENCY DEPT      CHIEF COMPLAINT       Chief Complaint   Patient presents with    Urinary Retention    Hematuria       Nurses Notes reviewed and I agree except as noted in the HPI.      HISTORY OF PRESENT ILLNESS    Onofre Toledo is a 68 y.o. male who presents with complaint of hematuria/urinary retention, patient said that he has not been able to urinate throughout the day, had a greenlight procedure done by [facility] 2 weeks ago.  He denies fever or chills, he has suprapubic tenderness.  Onset: Acute  Duration: Hours prior to arrival  Timing: Persistent  Location of Pain: Suprapubic  Intesity/severity: Moderate pain  Modifying Factors: Recent greenlight procedure of the prostate  Relieved by;  Previous Episodes;  Tx Before arrival: None    PAST MEDICAL HISTORY    has a past medical history of Elevated PSA, High blood sugar, History of ulcer disease, Parkinson's disease, and Urinary retention.    SURGICAL HISTORY      has a past surgical history that includes Appendectomy (01/01/1963); Nose surgery (01/01/1972); Vasectomy (01/01/1986); Toe Surgery (01/01/1999); shoulder surgery (Left, 01/01/2008); shoulder surgery (Right, 01/01/2011); Mohs surgery (01/01/2011); laparotomy (N/A, 09/30/2023); and Laser of prostate w/ green light pvp (01/18/2024).    CURRENT MEDICATIONS       Discharge Medication List as of 2/1/2024  1:17 AM        CONTINUE these medications which have NOT CHANGED    Details   Zinc 25 MG TABS Take 25 mg by mouth dailyHistorical Med      Melatonin 5 MG CHEW Take 2 mg by mouthHistorical Med      Cetirizine HCl 10 MG CAPS Take 10 mg by mouth daily as neededHistorical Med      carbidopa-levodopa (SINEMET)  MG per tablet Take 3 tablets by mouth 3 times dailyHistorical Med      melatonin 3 MG TABS tablet Take 20 mg by mouth nightlyHistorical Med      Cholecalciferol (VITAMIN D3) 2000 UNITS CAPS Take 1 capsule by mouthHistorical Med      tamsulosin (FLOMAX) 0.4 MG capsule  TAKE 1 CAPSULE DAILY, Disp-90 capsule, R-3Normal      UNABLE TO FIND Sinus support      Multiple Vitamin (MULTI-VITAMINS PO) Take 1 tablet by mouth daily.      fluvastatin XL (LESCOL XL) 80 MG SR tablet Take 1 tablet by mouth dailyHistorical Med      Ascorbic Acid (VITAMIN C) 500 MG tablet Take 1 tablet by mouth dailyHistorical Med             ALLERGIES     has No Known Allergies.    FAMILY HISTORY     has no family status information on file.    family history is not on file.    SOCIAL HISTORY      reports that he has never smoked. He has never been exposed to tobacco smoke. He has never used smokeless tobacco. He reports that he does not currently use alcohol. He reports that he does not use drugs.    PHYSICAL EXAM     INITIAL VITALS:  weight is 69.4 kg (153 lb). His oral temperature is 98.1 °F (36.7 °C). His blood pressure is 120/82 and his pulse is 87. His respiration is 16 and oxygen saturation is 97%.    Physical Exam   Constitutional:  well-developed and well-nourished.   HENT: Head: Normocephalic, atraumatic, Bilateral external ears normal, Oropharynx mosit, No oral exudates, Nose normal.   Eyes: PERRL, EOMI, Conjunctiva normal, No discharge. No scleral icterus  Neck: Normal range of motion, No tenderness, Supple  Lympatics: No lymphadenopathy.   Cardiovascular: Normal rate, regular rhythm, S1 normal and S2 normal.  Exam reveals no gallop.    Pulmonary/Chest: Effort normal and breath sounds normal. No accessory muscle usage or stridor. No respiratory distress.  no wheezes. has no rales. exhibits no tenderness.   Abdominal: Soft. Bowel sounds are normal.  exhibits no distension. There is no tenderness. There is no rebound and no guarding.   Genitourinary: Distended bladder on palpation  Extremities: No edema, no tenderness, no cyanosis, no clubbing.    Musculoskeletal: Good range of motion in major joints is observed.  No major deformities noted.  Neurological: Alert and oriented ×3, normal motor function,

## 2024-02-02 NOTE — PLAN OF CARE
Problem: Discharge Planning  Goal: Discharge to home or other facility with appropriate resources  Outcome: Progressing  Flowsheets (Taken 2/1/2024 2003)  Discharge to home or other facility with appropriate resources:   Identify barriers to discharge with patient and caregiver   Identify discharge learning needs (meds, wound care, etc)   Refer to discharge planning if patient needs post-hospital services based on physician order or complex needs related to functional status, cognitive ability or social support system   Arrange for needed discharge resources and transportation as appropriate     Problem: Genitourinary - Adult  Goal: Urinary catheter remains patent  Outcome: Progressing  Flowsheets (Taken 2/2/2024 0014)  Urinary catheter remains patent: Assess patency of urinary catheter   Care plan reviewed with patient.  Patient verbalizes understanding of the care plan and contributed to goal setting.
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
## 2024-02-02 NOTE — PROGRESS NOTES
Contacted [facility] [employer] [place] @ [phone] to cancel request to Mate[place]

## 2024-02-02 NOTE — PLAN OF CARE
Problem: Discharge Planning  Goal: Discharge to home or other facility with appropriate resources  2/2/2024 0932 by Sturgeon, Cara B, RN  Outcome: Progressing  Flowsheets (Taken 2/2/2024 0932)  Discharge to home or other facility with appropriate resources:   Arrange for needed discharge resources and transportation as appropriate   Arrange for interpreters to assist at discharge as needed   Identify discharge learning needs (meds, wound care, etc)   Identify barriers to discharge with patient and caregiver     Problem: Genitourinary - Adult  Goal: Urinary catheter remains patent  2/2/2024 0932 by Sturgeon, Cara B, RN  Outcome: Progressing  Flowsheets (Taken 2/2/2024 0932)  Urinary catheter remains patent:   Assess patency of urinary catheter   Irrigate catheter per Licensed Independent Practitioner order if indicated and notify Licensed Independent Practitioner if unable to irrigate   Assess need for a larger catheter size or a 3-way catheter for continuous bladder irrigation     Problem: Infection - Adult  Goal: Absence of infection during hospitalization  Outcome: Progressing  Flowsheets (Taken 2/2/2024 0932)  Absence of infection during hospitalization:   Assess and monitor for signs and symptoms of infection   Monitor all insertion sites i.e., indwelling lines, tubes and drains   [place] appropriate cooling/warming therapies per order   Instruct and encourage patient and family to use good hand hygiene technique   Monitor lab/diagnostic results   Administer medications as ordered

## 2024-02-02 NOTE — PROGRESS NOTES
Urine in tubing fruit punch colored after ambulation. Restarted CBI to slow rate overnight. Clamp at 0500.

## 2024-02-02 NOTE — PROGRESS NOTES
Hospitalist Progress Note    Patient:  Onofre Toledo      Unit/Bed:6A-08/008-A    YOB: 1955    MRN: [id number]       Acct: [id number]     PCP: Dereje Westbrook DO    Date of Admission: 2/1/2024    Assessment/Plan:    Gross hematuria with clots and urinary retention. Recent green light laser to his prostate approximately 2 weeks ago. Wesley was removed following procedure. Sent home. Started to have hematuria. Presented to the ED here 1/31. Found to be in urinary retention. Wesley inserted. Represented 2/1 with reports of blood clots, blockage and leakage around tube. Resolution of blockage with hand irrigation. Subsequent CBI has been set up. Primary urologist from [facility] was notified. Transfer accepted, however, no beds are available. He was been put on the waiting list. Currently in observation. SCDs for DVT prophylaxis. HH stable.  Urology consulted. Seen today, hand irrigated with multiple clots. CBI clamped.     UTI(POA). UA positive nitrite, Large leukocyte, WBC 15-25. Continue IV Rocephin started in ED. Culture has been set up, so far no growth.   Constipation. Started Colace. Miralax PRN.  Parkinsons disease. Resume home medications        Chief Complaint: Blood clots in catheter     Hospital Course:     The patient is a 68 y.o. male who presents with blood clots in wesley catheter. green light laser to his prostate approximately 2 weeks ago  Wesley was removed following procedure. Sent home. Started to have hematuria. Presented to the ED here 1/31 unable to void. Found to be in urinary retention with 700 ml out. Wesley inserted. Represented today with reports of blood clots, blockage and leakage around tube. Resolution of blockage with hand irrigation. Subsequent CBI has been set up. Primary urologist from [facility] was notified. Transfer accepted, however, no beds are available. He was been put on the waiting list. Irrigation currently with cherry red  Cranial nerves: II-XII intact, grossly non-focal.  Psychiatric: Alert and oriented, thought content appropriate, normal insight  Capillary Refill: Brisk,< 3 seconds   Peripheral Pulses: +2 palpable, equal bilaterally       Labs:   Recent Labs     02/01/24  0017 02/01/24  1446 02/02/24  0630   WBC 8.4 9.1 7.8   HGB 14.7 15.1 15.4   HCT 44.4 46.0 45.6    232 229     Recent Labs     02/01/24  0017 02/01/24  1446 02/02/24  0630    140 139   K 4.1 4.0 4.1    105 104   CO2 25 26 23   BUN 23* 19 17   CREATININE 1.0 0.9 0.9   CALCIUM 9.2 9.2 9.3     Recent Labs     02/01/24  1446   AST 15   ALT <5*   BILITOT 0.4   ALKPHOS 96     Recent Labs     02/01/24  1446   INR 0.98     No results for input(s): \"CKTOTAL\", \"TROPONINI\" in the last 72 hours.    Urinalysis:      Lab Results   Component Value Date/Time    NITRU POSITIVE 02/01/2024 01:14 PM    WBCUA 15-25 02/01/2024 01:14 PM    BACTERIA NONE SEEN 02/01/2024 01:14 PM    RBCUA > 200 02/01/2024 01:14 PM    BLOODU SMALL 02/01/2024 01:14 PM    SPECGRAV 1.020 02/01/2024 12:12 AM    GLUCOSEU NEGATIVE 02/01/2024 01:14 PM       Radiology:  CT ABDOMEN PELVIS WO CONTRAST   Final Result   1. Layering high density material within the urinary bladder suggest the presence of hemorrhage. The urinary bladder wall is mildly thickened and there is marked prostatomegaly visualized. Correlate with urinalysis, PSA levels, and cystoscopy if    clinically indicated.      2. Punctate nonobstructing bilateral nephrolithiasis is described. Chronic findings are discussed.            **This report has been created using voice recognition software.  It may contain minor errors which are inherent in voice recognition technology.**      Final report electronically signed by Dr Barbara Garcia on 2/2/2024 9:51 AM          Diet: ADULT DIET; Regular    DVT prophylaxis: [] Lovenox                                 [x] SCDs                                 [] SQ Heparin

## 2024-02-02 NOTE — ED NOTES
PT resting in bed. Bedside report received from An MACKENZIE. PT and VS assessed. CBI emptied. Call light in reach. Spouse at bedside.

## 2024-02-02 NOTE — PROGRESS NOTES
Pt admitted to 6A8 via via cart/stretcher from ED.  Complaints: Hematuria.    IV present in the Left Arm. IV site free of s/s of infection or infiltration.   CBI present upon admission with ~1000 mls in each bag.  Vital signs obtained. Assessment and data collection initiated. Two nurse skin assessment performed by Gita MACKENZIE and Yaneth MACKENZIE.   Oriented to room. Policies and procedures for 6A explained. All questions answered with no further questions at this time. Fall prevention and safety brochure discussed with patient.  Bed alarm on. Call light in reach.

## 2024-02-02 NOTE — CARE COORDINATION
Case Management Assessment  Initial Evaluation    Date/Time of Evaluation: 2/2/2024 12:27 PM  Assessment Completed by: Lelo Rayomnd RN    If patient is discharged prior to next notation, then this note serves as note for discharge by case management.    Patient Name: Onofre Toledo                   YOB: 1955  Diagnosis: Complicated urinary tract infection [N39.0]  Gross hematuria [R31.0]  Acute urinary retention [R33.8]                   Date / Time: 2/1/2024 11:57 AM  Location: [facility]08/008     Patient Admission Status: Observation   Readmission Risk Low 0-14, Mod 15-19), High > 20: Readmission Risk Score: 7.8    Current PCP: Dereje Westbrook, DO  PCP verified by CM? Yes    Chart Reviewed: Yes      History Provided by: Patient, Spouse  Patient Orientation: Alert and Oriented    Patient Cognition: Alert    Hospitalization in the last 30 days (Readmission):  No    If yes, Readmission Assessment in CM Navigator will be completed.    Advance Directives:      Code Status: Full Code   Patient's Primary Decision Maker is: Named in Scanned ACP Document    Primary Decision Maker: Laura Toledo - Spouse - [phone]    Secondary Decision Maker: JUANJOSE - Child - [phone]    Discharge Planning:    Patient lives with: Spouse/Significant Other Type of Home: House  Primary Care Giver: Self  Patient Support Systems include: Spouse/Significant Other, Family Members, Friends/Neighbors, Children   Current Financial resources: Medicare, Other (Comment) (BCBS commercial secondary)  Current community resources: None  Current services prior to admission: None            Current DME:              Type of Home Care services:  None    ADLS  Prior functional level: Independent in ADLs/IADLs  Current functional level: Independent in ADLs/IADLs    Family can provide assistance at DC: Yes  Would you like Case Management to discuss the discharge plan with any other family members/significant others, and if so, who? Yes

## 2024-02-02 NOTE — PROGRESS NOTES
Urine remains cherry red after ambulation, CBI open to wide open, but is infusing sluggishly. Hand irrigated with 3 syringes full. No clots withdrawn. CBI left wife open.

## 2024-02-02 NOTE — ED NOTES
Spoke to CHRIS Wolfe to approve pt transport to [facility] in stable condition.

## 2024-02-03 VITALS
OXYGEN SATURATION: 98 % | WEIGHT: 153 LBS | DIASTOLIC BLOOD PRESSURE: 70 MMHG | HEART RATE: 76 BPM | RESPIRATION RATE: 16 BRPM | BODY MASS INDEX: 24.01 KG/M2 | HEIGHT: 67 IN | SYSTOLIC BLOOD PRESSURE: 103 MMHG | TEMPERATURE: 97.5 F

## 2024-02-03 LAB
BACTERIA BLD AEROBE CULT: NORMAL
BACTERIA BLD AEROBE CULT: NORMAL
BACTERIA UR CULT: NORMAL

## 2024-02-03 PROCEDURE — G0378 HOSPITAL OBSERVATION PER HR: HCPCS

## 2024-02-03 PROCEDURE — 6360000002 HC RX W HCPCS: Performed by: NURSE PRACTITIONER

## 2024-02-03 PROCEDURE — 99239 HOSP IP/OBS DSCHRG MGMT >30: CPT | Performed by: NURSE PRACTITIONER

## 2024-02-03 PROCEDURE — 6370000000 HC RX 637 (ALT 250 FOR IP): Performed by: NURSE PRACTITIONER

## 2024-02-03 PROCEDURE — 2580000003 HC RX 258: Performed by: NURSE PRACTITIONER

## 2024-02-03 PROCEDURE — 1200000000 HC SEMI PRIVATE

## 2024-02-03 PROCEDURE — 99231 SBSQ HOSP IP/OBS SF/LOW 25: CPT | Performed by: UROLOGY

## 2024-02-03 RX ADMIN — Medication 3 TABLET: at 11:19

## 2024-02-03 RX ADMIN — Medication 3 TABLET: at 06:05

## 2024-02-03 RX ADMIN — CEFTRIAXONE SODIUM 1000 MG: 1 INJECTION, POWDER, FOR SOLUTION INTRAMUSCULAR; INTRAVENOUS at 14:31

## 2024-02-03 RX ADMIN — DOCUSATE SODIUM 100 MG: 100 CAPSULE, LIQUID FILLED ORAL at 07:55

## 2024-02-03 RX ADMIN — MAGNESIUM HYDROXIDE 30 ML: 1200 LIQUID ORAL at 11:18

## 2024-02-03 NOTE — DISCHARGE INSTRUCTIONS
A Haynes catheter is a thin, flexible tube that drains urine from your bladder. The catheter connects to a special bag. The bag holds the urine until you are able to empty the bag. You may need to have a catheter for a short time. You may need a catheter after you are sick or have had surgery. Sometimes, a catheter is used for a long time.    What will the results be?   Your urine will drain and you will prevent infection.  What care is needed at home?   Ask your doctor what you need to do when you go home. Make sure you ask questions if you do not understand what the doctor says. This way you will know what you need to do.  Your doctor may order a home health nurse to come to your house to help you learn to care for your catheter.  Prevent infections:  Wash your hands before and after handling your catheter.  If you switch between a leg bag and an overnight drainage bag, be sure you clean the connection between the catheter and the bag before you switch bags.  Ask your doctor what to use to clean the connection.  Rinse your used, empty drainage bag with 1 cup (240 mL) vinegar mixed with 1 cup (240 mL) water. Shake this around and allow to sit for about 15 minutes. Rinse thoroughly with cool water and allow to dry.  Place a cap on the drainage bag you are not using and store in a clean towel. It is helpful to have an extra bag ready for use.  Care for the catheter:  Wash the skin around the catheter with soap and water each day. Rinse well and pat the skin dry.  Do not use lotions or cream on the tube.  Keep the tube secure. You may want to use Velcro straps or some other device to keep the bag or catheter on your leg.  Do not let the tube pull or catch when you are moving around.  Do not let the tube kink or loop.  Do not clamp the catheter or tubing unless you were told to do this.  Care for the drainage bag:  Keep your urine bag below your bladder.  Drain the bag often to help keep you from getting an  able to do these:  I can tell you about my condition.  I can tell you how to prevent infection and care for the tube and bag of my Haynes catheter.  I can tell you what I will do if my urine stops flowing or there is a burning or painful feeling in my bladder.

## 2024-02-03 NOTE — PROGRESS NOTES
Urology Progress Note    Reason for Consult:  Gross hematuria   Requesting Physician:  medicine     History Obtained From:  patient, electronic medical record,      Chief Complaint: hematuria    Subjective: \"    Patient is resting in bed, voiding tea colored urine via wesley, cbi off, +flatus, +BM, ambulating with assistance, tolerating regular diet, denies any nausea or vomiting. There are complaints of controlled pain at this time.    Slept well  CBI off, wesley with tea colored urine this am  Hand irrigated, no clots, but irrigant now more cherry  Restart cbi, hand irrigate as needed    Ok for discharge from URO standpoint with wesley if urine does not get more red  If plans to stay, restart CBI, clamp at 0500  Possible intervention in am if urine gets bloodier or clots develop    NPO midnight    Hematuria - irrigated to light cherry, see cbi order  UTI - Ux no growth prelim, cont abx  BPH - s/p greenlight     Reviewed with established urologist, updated Dr Madsen        Vitals:  /74   Pulse 78   Temp 97.7 °F (36.5 °C) (Oral)   Resp 16   Ht 1.702 m (5' 7\")   Wt 69.4 kg (153 lb)   SpO2 97%   BMI 23.96 kg/m²   Temp  Av.9 °F (36.6 °C)  Min: 97.6 °F (36.4 °C)  Max: 98.2 °F (36.8 °C)    Intake/Output Summary (Last 24 hours) at 2/3/2024 0907  Last data filed at 2/3/2024 0903  Gross per 24 hour   Intake 1755.06 ml   Output -400 ml   Net 2155.06 ml       Social History     Socioeconomic History    Marital status:      Spouse name: Not on file    Number of children: Not on file    Years of education: Not on file    Highest education level: Not on file   Occupational History    Not on file   Tobacco Use    Smoking status: Never     Passive exposure: Never    Smokeless tobacco: Never   Substance and Sexual Activity    Alcohol use: Not Currently    Drug use: Never    Sexual activity: Not on file   Other Topics Concern    Not on file   Social History Narrative    Not on file     Social Determinants

## 2024-02-03 NOTE — PROGRESS NOTES
Physician Progress Note      PATIENT:               KOSTA MARTINEZ  CSN #:                  [id number]  :                       1955  ADMIT DATE:       2024 11:57 AM  DISCH DATE:  RESPONDING  PROVIDER #:        Toby Knapp CNP          QUERY TEXT:    Pt admitted with gross hematuria. Pt noted to be post-operative from   Greenlight procedure x2 weeks and have urinary retention with UTI. If   possible, please document in progress notes and discharge summary the   relationship, if any, between hematuria and the following:    The medical record reflects the following:  Risk Factors: gross hematuria  Clinical Indicators: presented with gross hematuria and urinary retention post   greenlight vaporization 2 weeks ago  Treatment: Labs, imaging, monitoring, CBI  Options provided:  -- Hematuria due to procedure 2 weeks ago  -- Hematuria due to UTI  -- Hematuria due to other, Please document cause.  -- Other - I will add my own diagnosis  -- Disagree - Not applicable / Not valid  -- Disagree - Clinically unable to determine / Unknown  -- Refer to Clinical Documentation Reviewer    PROVIDER RESPONSE TEXT:    This patient has hematuria due to procedure 2 weeks ago.    Query created by: Riri Cantu on 2/3/2024 12:40 PM      Electronically signed by:  Toby Knapp CNP 2/3/2024 2:03 PM

## 2024-02-03 NOTE — DISCHARGE SUMMARY
Hospital Medicine Discharge Summary      Patient Identification:   Onofre Toledo   : 1955  MRN: [id number]   Account: [id number]      Patient's PCP: Dereje Westbrook DO    Admit Date: 2024     Discharge Date:       Admitting Physician: SANTA Haynes CNP     Discharge Physician: SANTA Haynes CNP     Discharge Diagnoses and Hospital Course:    Presented to the ED with blocked wesley due to blood clots.    Gross hematuria with clots and urinary retention. Recent green light laser to his prostate approximately 2 weeks ago. Wesley was removed following procedure. Sent home. Started to have hematuria. Presented to the ED here . Found to have urinary retention. Wesley inserted. Represented  with reports of blood clots, blockage and leakage around tube. Resolution of blockage with hand irrigation. Subsequent CBI set up. Primary urologist from [facility] was notified. Transfer accepted, however, no beds were available. He was agreeable to stay here. Flomax was restarted. SCDs given for DVT prophylaxis. HH remained stable. Urology followed. CBI clamped. Urine tea colored today. Hand irrigated by urology, no clots - did have some cherry urine. He was kept for several hours to ensure CBI did not need to be restarted. Urine improved. No clots.   UTI(POA). UA positive nitrite, Large leukocyte, WBC 15-25. Continue IV Rocephin started in ED. Culture NGTD.   Constipation. Started Colace. Miralax PRN. MOM x 1 given.  Parkinsons disease. Resume home medications.    Cleared for discharge by Urology. He has an appointment Monday with his primary urologist.       The patient was seen and examined on day of discharge and this discharge summary is in conjunction with any daily progress note from day of discharge.      Exam:     Vitals:  Vitals:    24 2245 24 0314 24 0753 24 1116   BP: 114/71 110/71 104/74 103/70   Pulse: 73 75 78 76   Resp: 17  16 16 16   Temp: 97.9 °F (36.6 °C) 97.9 °F (36.6 °C) 97.7 °F (36.5 °C) 97.5 °F (36.4 °C)   TempSrc: Oral Oral Oral Oral   SpO2: 98% 99% 97% 98%   Weight:       Height:         Weight: Weight - Scale: 69.4 kg (153 lb)     24 hour intake/output:  Intake/Output Summary (Last 24 hours) at 2/3/2024 1445  Last data filed at 2/3/2024 1300  Gross per 24 hour   Intake 1835.06 ml   Output -550 ml   Net 2385.06 ml         General appearance:  No apparent distress, appears stated age and cooperative.  HEENT:  Normal cephalic, atraumatic without obvious deformity. Pupils equal, round, and reactive to light.  Extra ocular muscles intact. Conjunctivae/corneas clear.  Neck: Supple, with full range of motion. No jugular venous distention. Trachea midline.  Respiratory:  Normal respiratory effort. Clear to auscultation, bilaterally without Rales/Wheezes/Rhonchi.  Cardiovascular:  Regular rate and rhythm with normal S1/S2 without murmurs, rubs or gallops.  Abdomen: Soft, non-tender, non-distended with normal bowel sounds.  Musculoskeletal:  No clubbing, cyanosis or edema bilaterally.  Full range of motion without deformity.  Skin: Skin color, texture, turgor normal.  No rashes or lesions.  Neurologic:  Neurovascularly intact without any focal sensory/motor deficits. Cranial nerves: II-XII intact, grossly non-focal.  Psychiatric:  Alert and oriented, thought content appropriate, normal insight  Capillary Refill: Brisk,< 3 seconds   Peripheral Pulses: +2 palpable, equal bilaterally       Labs: For convenience and continuity at follow-up the following most recent labs are provided:      CBC:    Lab Results   Component Value Date/Time    WBC 7.8 02/02/2024 06:30 AM    HGB 15.4 02/02/2024 06:30 AM    HCT 45.6 02/02/2024 06:30 AM     02/02/2024 06:30 AM       Renal:    Lab Results   Component Value Date/Time     02/02/2024 06:30 AM    K 4.1 02/02/2024 06:30 AM     02/02/2024 06:30 AM    CO2 23 02/02/2024 06:30 AM    BUN  other medications prescribed for you. Read the directions carefully, and ask your doctor or other care provider to review them with you.                  Time Spent on discharge is more than 36 minutes in the examination, evaluation, counseling and review of medications and discharge plan.        Signed:    Thank you ColDereje oleary DO for the opportunity to be involved in this patient's care.    Electronically signed by SANTA Haynes CNP on 2/3/2024 at 2:45 PM

## 2024-02-03 NOTE — PLAN OF CARE
Problem: Discharge Planning  Goal: Discharge to home or other facility with appropriate resources  2/2/2024 2219 by Leonardo Allen, RN  Outcome: Progressing  Flowsheets (Taken 2/2/2024 2000)  Discharge to home or other facility with appropriate resources:   Identify barriers to discharge with patient and caregiver   Arrange for needed discharge resources and transportation as appropriate   Identify discharge learning needs (meds, wound care, etc)   Refer to discharge planning if patient needs post-hospital services based on physician order or complex needs related to functional status, cognitive ability or social support system  2/2/2024 0932 by Sturgeon, Cara B, RN  Outcome: Progressing  Flowsheets (Taken 2/2/2024 0932)  Discharge to home or other facility with appropriate resources:   Arrange for needed discharge resources and transportation as appropriate   Arrange for interpreters to assist at discharge as needed   Identify discharge learning needs (meds, wound care, etc)   Identify barriers to discharge with patient and caregiver     Problem: Genitourinary - Adult  Goal: Urinary catheter remains patent  2/2/2024 2219 by Leonardo Allen RN  Outcome: Progressing  Flowsheets (Taken 2/2/2024 2000)  Urinary catheter remains patent:   Assess patency of urinary catheter   Irrigate catheter per Licensed Independent Practitioner order if indicated and notify Licensed Independent Practitioner if unable to irrigate  2/2/2024 0932 by Sturgeon, Cara B, RN  Outcome: Progressing  Flowsheets (Taken 2/2/2024 0932)  Urinary catheter remains patent:   Assess patency of urinary catheter   Irrigate catheter per Licensed Independent Practitioner order if indicated and notify Licensed Independent Practitioner if unable to irrigate   Assess need for a larger catheter size or a 3-way catheter for continuous bladder irrigation     Problem: Infection - Adult  Goal: Absence of infection during hospitalization  2/2/2024 2219 by

## 2024-02-06 LAB
BACTERIA BLD AEROBE CULT: NORMAL
BACTERIA BLD AEROBE CULT: NORMAL

## 2024-03-19 ENCOUNTER — TELEPHONE (OUTPATIENT)
Dept: SURGERY | Age: 69
End: 2024-03-19

## 2024-03-19 ENCOUNTER — OFFICE VISIT (OUTPATIENT)
Dept: SURGERY | Age: 69
End: 2024-03-19
Payer: MEDICARE

## 2024-03-19 VITALS
SYSTOLIC BLOOD PRESSURE: 126 MMHG | DIASTOLIC BLOOD PRESSURE: 62 MMHG | OXYGEN SATURATION: 98 % | WEIGHT: 161.3 LBS | HEART RATE: 70 BPM | RESPIRATION RATE: 16 BRPM | BODY MASS INDEX: 25.31 KG/M2 | TEMPERATURE: 97.1 F | HEIGHT: 67 IN

## 2024-03-19 DIAGNOSIS — Z01.818 PRE-OP TESTING: ICD-10-CM

## 2024-03-19 DIAGNOSIS — K43.2 INCISIONAL HERNIA, WITHOUT OBSTRUCTION OR GANGRENE: Primary | ICD-10-CM

## 2024-03-19 PROCEDURE — G8427 DOCREV CUR MEDS BY ELIG CLIN: HCPCS | Performed by: SURGERY

## 2024-03-19 PROCEDURE — G8419 CALC BMI OUT NRM PARAM NOF/U: HCPCS | Performed by: SURGERY

## 2024-03-19 PROCEDURE — 1036F TOBACCO NON-USER: CPT | Performed by: SURGERY

## 2024-03-19 PROCEDURE — 99214 OFFICE O/P EST MOD 30 MIN: CPT | Performed by: SURGERY

## 2024-03-19 PROCEDURE — 3017F COLORECTAL CA SCREEN DOC REV: CPT | Performed by: SURGERY

## 2024-03-19 PROCEDURE — 1123F ACP DISCUSS/DSCN MKR DOCD: CPT | Performed by: SURGERY

## 2024-03-19 PROCEDURE — G8484 FLU IMMUNIZE NO ADMIN: HCPCS | Performed by: SURGERY

## 2024-03-19 NOTE — TELEPHONE ENCOUNTER
Patient scheduled for surgery with Dr. Brothers on 06- at 7:30am with an arrival of 6:00am.  Preop orders (H/H, BMP) and surgery instructions given to patient.  Surgery consent to be signed.  Antibacterial soap given.  Cardiac clearance per Dr. Ramirez.

## 2024-03-19 NOTE — TELEPHONE ENCOUNTER
Robotic Surgery Scheduling Form   [facility] [address]  [place], [place] [address]    Phone * [phone]   Surgical Scheduling Direct line Phone * [phone]  Fax * [phone]      Onofre Toledo      1955    male    [address] [place] [address]   Marital Status:         Home Phone: [phone]   Cell Phone:   Telephone Information:   Mobile [phone]              Surgeon: Dr. Brothers  Surgery Date:06-   Time: 7:30am     Procedure: Robotic assisted incisional hernia repair with Phasix mesh possible open    Outpatient     Diagnosis: Incisional hernia    Important Medical History: In Epic    Special Inst/Equip:     CPT Codes: 85752    Latex Allergy:   no Cardiac Device:  no    Anesthesia Type: General    Case Location:  Main OR     Preadmission Testing: Phone Call      PAT Date and Time: ________________________________    PAT Confirmation #: _________________________________    Post Op Visit:  ______________________________________    Need Preop Cardiac Clearance:   yes,     Does patient have Cardiologist/physician?   Dr. Ramirez    Surgery Conformation #:  ______________________________________________    : __________________________________ Date:____________________      RNFA (colon resection only):      Insurance Company Name:  Medicare

## 2024-03-28 ASSESSMENT — ENCOUNTER SYMPTOMS
ABDOMINAL DISTENTION: 1
ABDOMINAL PAIN: 1

## 2024-03-28 NOTE — PROGRESS NOTES
use: Never    Sexual activity: Not on file   Other Topics Concern    Not on file   Social History Narrative    Not on file     Social Determinants of Health     Financial Resource Strain: Not on file   Food Insecurity: Not on file   Transportation Needs: Not on file   Physical Activity: Not on file   Stress: Not on file   Social Connections: Not on file   Intimate Partner Violence: Not on file   Housing Stability: Not on file        Review of Systems:  Review of Systems   Constitutional: Negative.    HENT: Negative.     Gastrointestinal:  Positive for abdominal distention and abdominal pain.   Genitourinary:  Positive for frequency.   Musculoskeletal: Negative.    Skin: Negative.    Neurological: Negative.    Hematological: Negative.    Psychiatric/Behavioral: Negative.         OBJECTIVE:   CURRENT VITALS:  height is 1.702 m (5' 7\") and weight is 73.2 kg (161 lb 4.8 oz). His temperature is 97.1 °F (36.2 °C). His blood pressure is 126/62 and his pulse is 70. His respiration is 16 and oxygen saturation is 98%.  Body mass index is 25.26 kg/m².  Physical Exam  Constitutional:       Appearance: He is well-developed.   HENT:      Head: Normocephalic and atraumatic.      Nose: Nose normal.   Eyes:      General: No scleral icterus.     Pupils: Pupils are equal, round, and reactive to light.   Neck:      Thyroid: No thyromegaly.   Cardiovascular:      Rate and Rhythm: Normal rate and regular rhythm.   Pulmonary:      Effort: Pulmonary effort is normal.   Abdominal:      Palpations: Abdomen is soft. There is no mass.      Tenderness: There is no abdominal tenderness. There is no guarding or rebound.      Hernia: No hernia is present.          Comments: Small reducible hernia.  Fascial defect approximately 5 cm apart.   Musculoskeletal:         General: Normal range of motion.      Cervical back: Normal range of motion.   Skin:     Findings: No erythema.   Neurological:      Mental Status: He is alert and oriented to person,

## 2024-05-23 LAB
BUN BLDV-MCNC: 21 MG/DL
CALCIUM SERPL-MCNC: 9.2 MG/DL
CHLORIDE BLD-SCNC: 106 MMOL/L
CO2: 25 MMOL/L
CREAT SERPL-MCNC: 1.2 MG/DL
EGFR: 61
GLUCOSE BLD-MCNC: 96 MG/DL
HCT VFR BLD CALC: 45.7 % (ref 41–53)
HEMOGLOBIN: 15 G/DL (ref 13.5–17.5)
POTASSIUM SERPL-SCNC: 4.1 MMOL/L
SODIUM BLD-SCNC: 142 MMOL/L

## 2024-05-28 ENCOUNTER — PREP FOR PROCEDURE (OUTPATIENT)
Dept: SURGERY | Age: 69
End: 2024-05-28

## 2024-05-28 RX ORDER — SODIUM CHLORIDE 9 MG/ML
INJECTION, SOLUTION INTRAVENOUS CONTINUOUS
Status: CANCELLED | OUTPATIENT
Start: 2024-05-28

## 2024-06-10 ENCOUNTER — ANESTHESIA EVENT (OUTPATIENT)
Dept: OPERATING ROOM | Age: 69
End: 2024-06-10
Payer: MEDICARE

## 2024-06-10 ENCOUNTER — ANESTHESIA (OUTPATIENT)
Dept: OPERATING ROOM | Age: 69
End: 2024-06-10
Payer: MEDICARE

## 2024-06-10 ENCOUNTER — HOSPITAL ENCOUNTER (OUTPATIENT)
Age: 69
Setting detail: OUTPATIENT SURGERY
Discharge: HOME | End: 2024-06-10
Attending: SURGERY | Admitting: SURGERY
Payer: MEDICARE

## 2024-06-10 VITALS
RESPIRATION RATE: 16 BRPM | SYSTOLIC BLOOD PRESSURE: 137 MMHG | HEIGHT: 67 IN | DIASTOLIC BLOOD PRESSURE: 72 MMHG | HEART RATE: 75 BPM | OXYGEN SATURATION: 92 % | BODY MASS INDEX: 25.43 KG/M2 | TEMPERATURE: 97.1 F | WEIGHT: 162 LBS

## 2024-06-10 PROCEDURE — 3700000000 HC ANESTHESIA ATTENDED CARE: Performed by: SURGERY

## 2024-06-10 PROCEDURE — 7100000010 HC PHASE II RECOVERY - FIRST 15 MIN: Performed by: SURGERY

## 2024-06-10 PROCEDURE — 6360000002 HC RX W HCPCS: Performed by: NURSE ANESTHETIST, CERTIFIED REGISTERED

## 2024-06-10 PROCEDURE — 49593 RPR AA HRN 1ST 3-10 RDC: CPT | Performed by: SURGERY

## 2024-06-10 PROCEDURE — 7100000011 HC PHASE II RECOVERY - ADDTL 15 MIN: Performed by: SURGERY

## 2024-06-10 PROCEDURE — 6370000000 HC RX 637 (ALT 250 FOR IP): Performed by: SURGERY

## 2024-06-10 PROCEDURE — 3600000019 HC SURGERY ROBOT ADDTL 15MIN: Performed by: SURGERY

## 2024-06-10 PROCEDURE — 2500000003 HC RX 250 WO HCPCS: Performed by: NURSE ANESTHETIST, CERTIFIED REGISTERED

## 2024-06-10 PROCEDURE — 2580000003 HC RX 258: Performed by: NURSE PRACTITIONER

## 2024-06-10 PROCEDURE — C1781 MESH (IMPLANTABLE): HCPCS | Performed by: SURGERY

## 2024-06-10 PROCEDURE — 7100000001 HC PACU RECOVERY - ADDTL 15 MIN: Performed by: SURGERY

## 2024-06-10 PROCEDURE — 6360000002 HC RX W HCPCS: Performed by: NURSE PRACTITIONER

## 2024-06-10 PROCEDURE — 6360000002 HC RX W HCPCS: Performed by: SURGERY

## 2024-06-10 PROCEDURE — S2900 ROBOTIC SURGICAL SYSTEM: HCPCS | Performed by: SURGERY

## 2024-06-10 PROCEDURE — 2709999900 HC NON-CHARGEABLE SUPPLY: Performed by: SURGERY

## 2024-06-10 PROCEDURE — 3600000009 HC SURGERY ROBOT BASE: Performed by: SURGERY

## 2024-06-10 PROCEDURE — 3700000001 HC ADD 15 MINUTES (ANESTHESIA): Performed by: SURGERY

## 2024-06-10 PROCEDURE — 7100000000 HC PACU RECOVERY - FIRST 15 MIN: Performed by: SURGERY

## 2024-06-10 DEVICE — PHASIX ST MESH WITH ECHO 2 POSITIONING SYSTEM, 10 CM X 15 CM (4" X 6"), ELLIPSE
Type: IMPLANTABLE DEVICE | Site: ABDOMEN | Status: FUNCTIONAL
Brand: PHASIX

## 2024-06-10 RX ORDER — HYDROMORPHONE HYDROCHLORIDE 2 MG/ML
INJECTION, SOLUTION INTRAMUSCULAR; INTRAVENOUS; SUBCUTANEOUS PRN
Status: DISCONTINUED | OUTPATIENT
Start: 2024-06-10 | End: 2024-06-10 | Stop reason: SDUPTHER

## 2024-06-10 RX ORDER — SODIUM CHLORIDE 9 MG/ML
INJECTION, SOLUTION INTRAVENOUS PRN
Status: DISCONTINUED | OUTPATIENT
Start: 2024-06-10 | End: 2024-06-10 | Stop reason: HOSPADM

## 2024-06-10 RX ORDER — BUPIVACAINE HYDROCHLORIDE 5 MG/ML
INJECTION, SOLUTION EPIDURAL; INTRACAUDAL PRN
Status: DISCONTINUED | OUTPATIENT
Start: 2024-06-10 | End: 2024-06-10 | Stop reason: ALTCHOICE

## 2024-06-10 RX ORDER — PROPOFOL 10 MG/ML
INJECTION, EMULSION INTRAVENOUS PRN
Status: DISCONTINUED | OUTPATIENT
Start: 2024-06-10 | End: 2024-06-10 | Stop reason: SDUPTHER

## 2024-06-10 RX ORDER — ONDANSETRON 2 MG/ML
INJECTION INTRAMUSCULAR; INTRAVENOUS PRN
Status: DISCONTINUED | OUTPATIENT
Start: 2024-06-10 | End: 2024-06-10 | Stop reason: SDUPTHER

## 2024-06-10 RX ORDER — ROCURONIUM BROMIDE 10 MG/ML
INJECTION, SOLUTION INTRAVENOUS PRN
Status: DISCONTINUED | OUTPATIENT
Start: 2024-06-10 | End: 2024-06-10 | Stop reason: SDUPTHER

## 2024-06-10 RX ORDER — SILDENAFIL CITRATE 20 MG/1
20 TABLET ORAL PRN
COMMUNITY

## 2024-06-10 RX ORDER — DEXAMETHASONE SODIUM PHOSPHATE 10 MG/ML
INJECTION, EMULSION INTRAMUSCULAR; INTRAVENOUS PRN
Status: DISCONTINUED | OUTPATIENT
Start: 2024-06-10 | End: 2024-06-10 | Stop reason: SDUPTHER

## 2024-06-10 RX ORDER — SODIUM CHLORIDE 0.9 % (FLUSH) 0.9 %
5-40 SYRINGE (ML) INJECTION PRN
Status: DISCONTINUED | OUTPATIENT
Start: 2024-06-10 | End: 2024-06-10 | Stop reason: HOSPADM

## 2024-06-10 RX ORDER — FENTANYL CITRATE 50 UG/ML
50 INJECTION, SOLUTION INTRAMUSCULAR; INTRAVENOUS EVERY 5 MIN PRN
Status: DISCONTINUED | OUTPATIENT
Start: 2024-06-10 | End: 2024-06-10 | Stop reason: HOSPADM

## 2024-06-10 RX ORDER — CARBIDOPA AND LEVODOPA 50; 200 MG/1; MG/1
1 TABLET, EXTENDED RELEASE ORAL NIGHTLY
COMMUNITY
Start: 2024-06-05

## 2024-06-10 RX ORDER — NALOXONE HYDROCHLORIDE 0.4 MG/ML
INJECTION, SOLUTION INTRAMUSCULAR; INTRAVENOUS; SUBCUTANEOUS PRN
Status: DISCONTINUED | OUTPATIENT
Start: 2024-06-10 | End: 2024-06-10 | Stop reason: HOSPADM

## 2024-06-10 RX ORDER — OXYCODONE HYDROCHLORIDE 5 MG/1
10 TABLET ORAL
Status: COMPLETED | OUTPATIENT
Start: 2024-06-10 | End: 2024-06-10

## 2024-06-10 RX ORDER — SODIUM CHLORIDE 9 MG/ML
INJECTION, SOLUTION INTRAVENOUS CONTINUOUS
Status: DISCONTINUED | OUTPATIENT
Start: 2024-06-10 | End: 2024-06-10 | Stop reason: HOSPADM

## 2024-06-10 RX ORDER — LIDOCAINE HCL/PF 100 MG/5ML
SYRINGE (ML) INJECTION PRN
Status: DISCONTINUED | OUTPATIENT
Start: 2024-06-10 | End: 2024-06-10 | Stop reason: SDUPTHER

## 2024-06-10 RX ORDER — SODIUM CHLORIDE 0.9 % (FLUSH) 0.9 %
5-40 SYRINGE (ML) INJECTION EVERY 12 HOURS SCHEDULED
Status: DISCONTINUED | OUTPATIENT
Start: 2024-06-10 | End: 2024-06-10 | Stop reason: HOSPADM

## 2024-06-10 RX ORDER — FENTANYL CITRATE 50 UG/ML
INJECTION, SOLUTION INTRAMUSCULAR; INTRAVENOUS PRN
Status: DISCONTINUED | OUTPATIENT
Start: 2024-06-10 | End: 2024-06-10 | Stop reason: SDUPTHER

## 2024-06-10 RX ADMIN — HYDROMORPHONE HYDROCHLORIDE 0.5 MG: 2 INJECTION INTRAMUSCULAR; INTRAVENOUS; SUBCUTANEOUS at 08:06

## 2024-06-10 RX ADMIN — FENTANYL CITRATE 75 MCG: 50 INJECTION, SOLUTION INTRAMUSCULAR; INTRAVENOUS at 07:25

## 2024-06-10 RX ADMIN — DEXAMETHASONE SODIUM PHOSPHATE 8 MG: 10 INJECTION, EMULSION INTRAMUSCULAR; INTRAVENOUS at 07:48

## 2024-06-10 RX ADMIN — Medication 100 MG: at 07:25

## 2024-06-10 RX ADMIN — SODIUM CHLORIDE: 9 INJECTION, SOLUTION INTRAVENOUS at 08:53

## 2024-06-10 RX ADMIN — SUGAMMADEX 20 MG: 100 INJECTION, SOLUTION INTRAVENOUS at 08:50

## 2024-06-10 RX ADMIN — ONDANSETRON 4 MG: 2 INJECTION INTRAMUSCULAR; INTRAVENOUS at 08:37

## 2024-06-10 RX ADMIN — ROCURONIUM BROMIDE 40 MG: 10 INJECTION INTRAVENOUS at 07:25

## 2024-06-10 RX ADMIN — SODIUM CHLORIDE: 9 INJECTION, SOLUTION INTRAVENOUS at 06:48

## 2024-06-10 RX ADMIN — WATER 2000 MG: 1 INJECTION INTRAMUSCULAR; INTRAVENOUS; SUBCUTANEOUS at 07:36

## 2024-06-10 RX ADMIN — OXYCODONE 10 MG: 5 TABLET ORAL at 09:53

## 2024-06-10 RX ADMIN — PROPOFOL 150 MG: 10 INJECTION, EMULSION INTRAVENOUS at 07:25

## 2024-06-10 ASSESSMENT — PAIN DESCRIPTION - DESCRIPTORS: DESCRIPTORS: ACHING;SHARP;SORE

## 2024-06-10 ASSESSMENT — PAIN DESCRIPTION - ORIENTATION: ORIENTATION: RIGHT;LEFT

## 2024-06-10 ASSESSMENT — PAIN SCALES - GENERAL
PAINLEVEL_OUTOF10: 7
PAINLEVEL_OUTOF10: 8

## 2024-06-10 ASSESSMENT — PAIN - FUNCTIONAL ASSESSMENT: PAIN_FUNCTIONAL_ASSESSMENT: 0-10

## 2024-06-10 ASSESSMENT — PAIN DESCRIPTION - LOCATION: LOCATION: ABDOMEN

## 2024-06-10 ASSESSMENT — ENCOUNTER SYMPTOMS
ABDOMINAL DISTENTION: 1
ABDOMINAL PAIN: 1

## 2024-06-10 NOTE — H&P
place, and time.         LABS:   No results for input(s): \"WBC\", \"HGB\", \"HCT\", \"PLT\", \"NA\", \"K\", \"CL\", \"CO2\", \"BUN\", \"CREATININE\", \"MG\", \"PHOS\", \"CALCIUM\", \"PTT\", \"INR\", \"AST\", \"ALT\", \"BILITOT\", \"BILIDIR\", \"AMYLASE\", \"LIPASE\", \"LDH\", \"LACTA\", \"NITRU\", \"COLORU\", \"BACTERIA\" in the last 72 hours.    Invalid input(s): \"PT\", \"WBCU\", \"RBCU\", \"LEUKOCYTESUA\"  RADIOLOGY:   I have personally reviewed the following films:  No orders to display       Thank you for the interesting evaluation. Further recommendations to follow.    Electronically signed by Jackelin Brothers MD on 3/28/2024 at 10:57 AM

## 2024-06-10 NOTE — ANESTHESIA PRE PROCEDURE
Department of Anesthesiology  Preprocedure Note       Name:  Onofre Toledo   Age:  69 y.o.  :  1955                                          MRN:  [id number]         Date:  6/10/2024      Surgeon: Surgeon(s):  Jackelin Brothers MD    Procedure: Procedure(s):  Robotic Incisional Hernia Repair with Phasix Mesh Poss Open    Medications prior to admission:   Prior to Admission medications    Medication Sig Start Date End Date Taking? Authorizing Provider   Cholecalciferol (VITAMIN D-1000 MAX ST PO) Take by mouth   Yes Darlin Virk MD   vitamin D 25 MCG (1000 UT) CAPS Take 1 capsule by mouth daily   Yes Darlin Virk MD   B Complex Vitamins (B COMPLEX 50 PO) Take 1 tablet by mouth daily   Yes Darlin Virk MD   sildenafil (REVATIO) 20 MG tablet Take 1 tablet by mouth as needed   Yes Darlin Virk MD   carbidopa-levodopa (SINEMET CR)  MG per extended release tablet Take 1 tablet by mouth nightly 24  Yes Darlin Virk MD   Zinc 25 MG TABS Take 25 mg by mouth daily    Darlin Virk MD   Melatonin 5 MG CHEW Take 2 mg by mouth    Darlin Virk MD   Cetirizine HCl 10 MG CAPS Take 10 mg by mouth daily as needed    Darlin Virk MD   carbidopa-levodopa (SINEMET)  MG per tablet Take 3 tablets by mouth 3 times daily    Darlin Virk MD   Multiple Vitamin (MULTI-VITAMINS PO) Take 1 tablet by mouth daily.    Darlin Virk MD   Ascorbic Acid (VITAMIN C) 500 MG tablet Take 1 tablet by mouth daily    Darlin Virk MD       Current medications:    Current Facility-Administered Medications   Medication Dose Route Frequency Provider Last Rate Last Admin   • 0.9 % sodium chloride infusion   IntraVENous Continuous Mercedes Chambers APRN - CNP 20 mL/hr at 06/10/24 0648 New Bag at 06/10/24 0648   • ceFAZolin (ANCEF) 2,000 mg in sterile water 20 mL IV syringe  2,000 mg IntraVENous On Call to OR Mercedes Chambers APRN - CNP

## 2024-06-10 NOTE — DISCHARGE INSTRUCTIONS
DR. BAUTISTA'S DISCHARGE INSTRUCTIONS    Pt Name: Onofre Toledo  Medical Record Number: [id number]  Today's Date: 6/10/2024  GENERAL ANESTHESIA OR SEDATION   1. Do not drive or operate hazardous machinery for 24 hours.  2. Do not make important business or personal decisions for 24 hours.  3. Do not drink alcoholic beverages or use tobacco for 24 hours.  ACTIVITY INSTRUCTIONS   Rest today. Resume light to normal activity tomorrow.  You may resume normal activity tomorrow. Do not engage in strenuous activity that may place stress on your incision.  Do not drive for 3-5 days and avoid heavy lifting, tugging, pullings greater than 10-20 lbs until seen in the office.    DIET INSTRUCTIONS   Begin with clear liquids. If not nauseated, may increase to a low-fat diet when you desire. Greasy and spicy foods are not advised.  Regular diet as tolerated.  MEDICATIONS   Prescription written for percgo2 mediae/norco. Take as directed.  Do not drink alcohol or drive while taking pain medications. You may experience dizziness or drowsiness with these medications. You may also experience constipation which can be relieved with stool softners or laxatives.  You may resume your daily prescription medication schedule unless otherwise specified.  Do not take 325mg Aspirin or other blood thinners such as Coumadin or Plavix for 5 days.  WOUND & DRESSING INSTRUCTIONS   Always ensure you and your care giver clean hands before and after caring for the wound.  Keep dressing clean and dry for 48 hours. Change when soiled or wet.      Allow steri-strips to fall off on their own if present, allow surgical glue to peel off on its own  Ice operative site for 20 minutes 4 times a day.     May wash over incision in shower in 48 hours, but do not soak in a bath.  ABDOMINAL & LAPAROSCOPIC PROCEDURES   1. You are encouraged to get up and move around as this helps with the circulation and speeds up the healing process.  2. Breath deeply and cough from time to

## 2024-06-10 NOTE — PROGRESS NOTES
Patient oriented to Same Day department and admitted to Same Day Surgery room 14.   Patient verbalized approval for first name, last initial with physician name on unit whiteboard.     Plan of care reviewed with patient.   Patient room whiteboard filled out and discussed with patient and responsible adult.   Patient and responsible adult offered Same Day Welcome Packet to review.    Call light in reach.   Bed in lowest position, locked, with one bed rail up.   SCDs and warming blanket in place.  Appropriate arm bands on patient.   Bathroom offered.   All questions and concerns of patient addressed.        Meds to Beds:   Patient informed of [facility] Meds to Beds program during admission. Patient is agreeable to program.   Contact information for the pharmacy and the Meds to Beds program:   Name: Laura   Relationship to patient:spouse/significant other   Phone number: [phone]

## 2024-06-10 NOTE — OP NOTE
Operative Note      Patient: Onofre Toledo  YOB: 1955  MRN: [id number]    Date of Procedure: 6/10/2024    Pre-Op Diagnosis Codes:     * Incisional hernia, without obstruction or gangrene [K43.2]    Post-Op Diagnosis:  same        Procedure(s):  Robotic Incisional Hernia Repair with Phasix Mesh    Surgeon(s):  Jackelin Brothers MD    Assistant:   First Assistant: Mallory Menard    Anesthesia: General    Estimated Blood Loss (mL): 10 ml    Complications: None    Specimens:   * No specimens in log *    Implants:  Implant Name Type Inv. Item Serial No.  Lot No. LRB No. Used Action   MESH CORY CIR [id number] W/ ECHO 2 POS SYS PHASIX ST - [id number]  MESH CORY CIR [id number] W/ ECHO 2 POS SYS PHASIX ST  BARD DAVOL-WD [id number] N/A 1 Implanted         Drains:   [REMOVED] NG/OG/NJ/NE Tube Orogastric 16 fr Right mouth (Removed)       Findings:  Infection Present At Time Of Surgery (PATOS) (choose all levels that have infection present):  No infection present  Other Findings: ventral hernia 10x 6 cm in size     Detailed Description of Procedure:   He was seen in the preoperative holding room informed consent was reviewed he was taken the operative placed operative table after adequate anesthesia formed by was performed was prepped and draped in normal sterile fashion.  Local anesthetic was instilled at Reed's point skin incision was made the Veress needle was inserted there was good return air good fluid drop pneumoperitoneum was achieved.  8 mm trocar was then used in abdominal cavity at this location initial inspection was unremarkable with the exception of adhesions to the anterior abdominal wall.  2 distal trocars were placed, a 5 mm down the left anterior abdominal wall in the anterior axillary line and a 12 mm subxiphoid trocar.  The robotic system was docked.  Sharp adhesiolysis was then performed removing the adhesions from the anterior abdominal wall mostly greater omentum after doing this,

## 2024-06-10 NOTE — PROGRESS NOTES
Pt returned to [facility] room 14. Vitals and assessment as charted. 0.9 infusing, @400ml to count from PACU. Pt has crackers and water. Family at the bedside. Pt and family verbalized understanding of discharge criteria and call light use. Call light in reach.

## 2024-06-10 NOTE — ANESTHESIA POSTPROCEDURE EVALUATION
Department of Anesthesiology  Postprocedure Note    Patient: Onofre Toledo  MRN: [id number]  YOB: 1955  Date of evaluation: 6/10/2024    Procedure Summary       Date: 06/10/24 Room / Location: [facility] OR [address] [place] [address] OR    Anesthesia Start: 0721 Anesthesia Stop: 0902    Procedure: Robotic Incisional Hernia Repair with Phasix Mesh (Abdomen) Diagnosis:       Incisional hernia, without obstruction or gangrene      (Incisional hernia, without obstruction or gangrene [K43.2])    Surgeons: Jackelin Brothers MD Responsible Provider: Wale Jamil DO    Anesthesia Type: general ASA Status: 2            Anesthesia Type: No value filed.    Lashawn Phase I: Lashawn Score: 8    Lashawn Phase II: Lashawn Score: 10    Anesthesia Post Evaluation    Comments: [facility]  POST-ANESTHESIA NOTE       Name:  Onofre Toledo                                         Age:  69 y.o.  MRN:  [id number]      Last Vitals:  /72   Pulse 75   Temp 97.1 °F (36.2 °C) (Temporal)   Resp 16   Ht 1.702 m (5' 7\")   Wt 73.5 kg (162 lb)   SpO2 92%   BMI 25.37 kg/m²   Patient Vitals in the past 4 hrs:  06/10/24 1055, BP:137/72, Pulse:75, Resp:16, SpO2:92 %  06/10/24 0938, BP:(!) 162/85, Pulse:64, Resp:16, SpO2:91 %  06/10/24 0930, BP:(!) 166/72, Pulse:65, Resp:13, SpO2:97 %  06/10/24 0925, BP:(!) 162/75, Pulse:63, Resp:17, SpO2:97 %  06/10/24 0920, BP:(!) 167/75, Pulse:63, Resp:13, SpO2:96 %  06/10/24 0915, BP:(!) 148/81, Pulse:61, Resp:15, SpO2:97 %  06/10/24 0910, BP:(!) 154/78, Pulse:59, Resp:11, SpO2:96 %  06/10/24 0905, BP:(!) 150/74, Pulse:61, Resp:11, SpO2:96 %  06/10/24 0900, BP:(!) 161/76, Temp:97.1 °F (36.2 °C), Temp src:Temporal, Pulse:67, Resp:15, SpO2:98 %    Level of Consciousness:  Awake    Respiratory:  Stable    Oxygen Saturation:  Stable    Cardiovascular:  Stable    Hydration:  Adequate    PONV:  Stable    Post-op Pain:  Adequate analgesia    Post-op Assessment:  No apparent anesthetic

## 2024-06-10 NOTE — PROGRESS NOTES
0858- pt to pacu. VSS. Pt drowsy, denies pain, nausea. Pt appears in no acute distress.    0907- pt resting in bed yes closed. VSS. Pt continues to deny complaint.     0914- pt continues to be drowsy, denies pain. VSS    0928- pt reports tolerable pain at this time, VSS. Pt meets criteria for discharge from pacu.     0931- pt returned to [facility] in stable condition. Spouse at bedside. VSS. Report given to Edouard

## 2024-06-10 NOTE — PROGRESS NOTES
Pt has met discharge criteria and states he is ready for discharge to home. IV removed, gauze and tape applied. Dressed in own clothes and personal belongings gathered. Discharge instructions (with opioid medication education information) given to pt and family; pt and family verbalized understanding of discharge instructions, prescriptions and follow up appointments. Pt transported to discharge lobby by [facility] staff.

## 2024-06-11 ENCOUNTER — TELEPHONE (OUTPATIENT)
Dept: SURGERY | Age: 69
End: 2024-06-11

## 2024-06-11 DIAGNOSIS — G89.18 POST-OP PAIN: Primary | ICD-10-CM

## 2024-06-11 NOTE — TELEPHONE ENCOUNTER
S/P Robotic Incisional Hernia Repair with Phasix Mesh 6/10/24    Patients spouse called to office with concern over patients pain not being controlled. Patient discharged without pain medications due to having older hydrocodone at home. Inquiring about different pain med script for better control. If approved please send to [id number] - [facility] [address] - P [phone] - F [phone] [[id number]]

## 2024-06-12 RX ORDER — OXYCODONE HYDROCHLORIDE AND ACETAMINOPHEN 5; 325 MG/1; MG/1
1 TABLET ORAL EVERY 6 HOURS PRN
Qty: 20 TABLET | Refills: 0 | Status: SHIPPED | OUTPATIENT
Start: 2024-06-12 | End: 2024-06-17

## 2024-06-20 PROBLEM — N40.1 BENIGN PROSTATIC HYPERPLASIA WITH URINARY FREQUENCY: Status: ACTIVE | Noted: 2018-01-12

## 2024-06-20 PROBLEM — I45.10 RIGHT BUNDLE BRANCH BLOCK: Status: ACTIVE | Noted: 2023-04-12

## 2024-06-20 PROBLEM — G47.33 OBSTRUCTIVE SLEEP APNEA SYNDROME: Status: ACTIVE | Noted: 2021-08-18

## 2024-06-20 PROBLEM — R31.0 GROSS HEMATURIA: Status: RESOLVED | Noted: 2024-02-01 | Resolved: 2024-06-20

## 2024-06-20 PROBLEM — E78.2 MIXED HYPERLIPIDEMIA: Status: ACTIVE | Noted: 2017-09-15

## 2024-06-20 PROBLEM — R35.0 BENIGN PROSTATIC HYPERPLASIA WITH URINARY FREQUENCY: Status: ACTIVE | Noted: 2018-01-12

## 2024-06-20 PROBLEM — G20.A1 PARKINSON'S DISEASE (HCC): Status: ACTIVE | Noted: 2017-04-20

## 2024-06-20 PROBLEM — R33.9 URINARY RETENTION: Status: RESOLVED | Noted: 2024-02-02 | Resolved: 2024-06-20

## 2024-06-20 PROBLEM — N30.01 ACUTE CYSTITIS WITH HEMATURIA: Status: RESOLVED | Noted: 2024-02-02 | Resolved: 2024-06-20

## 2024-06-20 PROBLEM — E44.0 MODERATE MALNUTRITION (HCC): Status: RESOLVED | Noted: 2023-10-05 | Resolved: 2024-06-20

## 2024-06-25 ENCOUNTER — OFFICE VISIT (OUTPATIENT)
Dept: SURGERY | Age: 69
End: 2024-06-25
Payer: MEDICARE

## 2024-06-25 VITALS
BODY MASS INDEX: 25.13 KG/M2 | OXYGEN SATURATION: 96 % | WEIGHT: 160.1 LBS | RESPIRATION RATE: 16 BRPM | HEART RATE: 63 BPM | HEIGHT: 67 IN | SYSTOLIC BLOOD PRESSURE: 118 MMHG | TEMPERATURE: 97 F | DIASTOLIC BLOOD PRESSURE: 62 MMHG

## 2024-06-25 DIAGNOSIS — Z09 POSTOP CHECK: Primary | ICD-10-CM

## 2024-06-25 PROCEDURE — 99213 OFFICE O/P EST LOW 20 MIN: CPT | Performed by: SURGERY

## 2024-06-25 RX ORDER — FLUVASTATIN 40 MG/1
40 CAPSULE ORAL NIGHTLY
COMMUNITY

## 2024-06-25 NOTE — PROGRESS NOTES
Jackelin Brothers MD  [facility]  General Surgery  Clinic Post op Note    Pt Name: Onofre Toledo  Medical Record Number: [id number]  Date of Birth 1955   Today's Date: 06/25/2024      ASSESSMENT       ICD-10-CM    1. Postop check  Z09            PLAN   Patient is making a good recovery no sign of recurrence.  Patient is very active, I reminded him the importance of no strenuous activity or heavy lifting to minimize recurrence.  He expressed understanding.  I will see him back as needed  SUBJECTIVE   Onofre is doing well, he is getting up and around.  He would back to work a week after surgery.  He is moderate pain.  No significant complaints.  He was to start lifting heavier things.  I reminded him 6 weeks no heavy lifting.       CURRENT MEDICATIONS     Current Outpatient Medications on File Prior to Visit   Medication Sig Dispense Refill    fluvastatin (LESCOL) 40 MG capsule Take 1 capsule by mouth nightly      Cholecalciferol (VITAMIN D-1000 MAX ST PO) Take by mouth      vitamin D 25 MCG (1000 UT) CAPS Take 1 capsule by mouth daily      B Complex Vitamins (B COMPLEX 50 PO) Take 1 tablet by mouth daily      sildenafil (REVATIO) 20 MG tablet Take 1 tablet by mouth as needed      carbidopa-levodopa (SINEMET CR)  MG per extended release tablet Take 1 tablet by mouth nightly      Zinc 25 MG TABS Take 25 mg by mouth daily      Melatonin 5 MG CHEW Take 2 mg by mouth      Cetirizine HCl 10 MG CAPS Take 10 mg by mouth daily as needed      carbidopa-levodopa (SINEMET)  MG per tablet Take 3 tablets by mouth 3 times daily      Multiple Vitamin (MULTI-VITAMINS PO) Take 1 tablet by mouth daily.      Ascorbic Acid (VITAMIN C) 500 MG tablet Take 1 tablet by mouth daily       No current facility-administered medications on file prior to visit.       OBJECTIVE   CURRENT VITALS:  height is 1.702 m (5' 7\") and weight is 72.6 kg (160 lb 1.6 oz). His temperature is 97 °F (36.1 °C). His blood pressure is 118/62

## 2025-01-30 ENCOUNTER — HOSPITAL ENCOUNTER (OUTPATIENT)
Dept: PHYSICAL THERAPY | Age: 70
Setting detail: THERAPIES SERIES
Discharge: HOME | End: 2025-01-30
Payer: MEDICARE

## 2025-01-30 PROCEDURE — 97165 OT EVAL LOW COMPLEX 30 MIN: CPT

## 2025-01-30 NOTE — PROGRESS NOTES
[facility]  OCCUPATIONAL THERAPY  DRIVING EVALUATION    PATIENT: Onofre Toledo  YOB: 1955  GENDER:  male  CSN: [id number]     Referring Practitioner Sb Rosario DO [id number]      Diagnosis Parkinson's disease without dyskinesia (HCC)   Treatment Diagnosis M62.81 Generalized Muscle Weakness     Date of Evaluation 1/30/25   Additional Pertinent History Onofre Toledo has a past medical history of Elevated PSA, High blood sugar, History of ulcer disease, Parkinson's disease (HCC), and Urinary retention.  he has a past surgical history that includes Appendectomy (01/01/1963); Nose surgery (01/01/1972); Vasectomy (01/01/1986); Toe Surgery (01/01/1999); shoulder surgery (Left, 01/01/2008); shoulder surgery (Right, 01/01/2011); Mohs surgery (01/01/2011); laparotomy (N/A, 09/30/2023); Laser of prostate w/ green light pvp (01/18/2024); ventral hernia repair (N/A, 06/10/2024); Colonoscopy w/ polypectomy; and Esophagogastroduodenoscopy (2021).     Allergies No Known Allergies   Medications   Current Outpatient Medications:     fluvastatin (LESCOL) 40 MG capsule, Take 1 capsule by mouth nightly, Disp: , Rfl:     Cholecalciferol (VITAMIN D-1000 MAX ST PO), Take by mouth, Disp: , Rfl:     vitamin D 25 MCG (1000 UT) CAPS, Take 1 capsule by mouth daily, Disp: , Rfl:     B Complex Vitamins (B COMPLEX 50 PO), Take 1 tablet by mouth daily, Disp: , Rfl:     sildenafil (REVATIO) 20 MG tablet, Take 1 tablet by mouth as needed, Disp: , Rfl:     carbidopa-levodopa (SINEMET CR)  MG per extended release tablet, Take 1 tablet by mouth nightly, Disp: , Rfl:     Zinc 25 MG TABS, Take 25 mg by mouth daily, Disp: , Rfl:     Melatonin 5 MG CHEW, Take 2 mg by mouth, Disp: , Rfl:     Cetirizine HCl 10 MG CAPS, Take 10 mg by mouth daily as needed, Disp: , Rfl:     carbidopa-levodopa (SINEMET)  MG per tablet, Take 3 tablets by mouth 3 times daily, Disp: , Rfl:     Multiple Vitamin

## (undated) DEVICE — YANKAUER,BULB TIP,W/O VENT,RIGID,STERILE: Brand: MEDLINE

## (undated) DEVICE — GLOVE ORANGE PI 7   MSG9070

## (undated) DEVICE — BINDER ABD UNISX 4 PNL PREM 6INX6INX12IN L XL 4

## (undated) DEVICE — BASIC SINGLE BASIN BTC-LF: Brand: MEDLINE INDUSTRIES, INC.

## (undated) DEVICE — REDUCER: Brand: ENDOWRIST

## (undated) DEVICE — SEAL

## (undated) DEVICE — BLADELESS OBTURATOR: Brand: WECK VISTA

## (undated) DEVICE — SUTURE PERMAHAND SZ 3-0 L18IN NONABSORBABLE BLK L26MM SH C013D

## (undated) DEVICE — SUTURE V-LOC 180 SZ 2-0 L9IN ABSRB GRN L27MM GS-22 1/2 CIR VLOCL2145

## (undated) DEVICE — TUBING, SUCTION, 1/4" X 20', STRAIGHT: Brand: MEDLINE INDUSTRIES, INC.

## (undated) DEVICE — TUBING INSUFFLATOR HEAT HUMIDIFIED SMK EVAC SET PNEUMOCLEAR

## (undated) DEVICE — SUTURE VICRYL + SZ 0 L27IN ABSRB VLT L26MM UR-6 5/8 CIR VCP603H

## (undated) DEVICE — PENCIL SMK EVAC ALL IN 1 DSGN ENH VISIBILITY IMPROVED AIR

## (undated) DEVICE — GOWN,SIRUS,NON REINFRCD,LARGE,SET IN SL: Brand: MEDLINE

## (undated) DEVICE — SUTURE ETHLN SZ 2-0 L30IN NONABSORBABLE BLK L36MM FSLX 3/8 1674H

## (undated) DEVICE — COLUMN DRAPE

## (undated) DEVICE — 1LYRTR 16FR10ML100%SILTMPS SNP: Brand: MEDLINE INDUSTRIES, INC.

## (undated) DEVICE — SUTURE VCRL + SZ 0 L27IN ABSRB VLT L36MM CT-1 1/2 CIR VCPB260H

## (undated) DEVICE — GENERAL LAPAROSCOPY-LF: Brand: MEDLINE INDUSTRIES, INC.

## (undated) DEVICE — TIP COVER ACCESSORY

## (undated) DEVICE — YANKAUER,POOLE TIP,STERILE,50/CS: Brand: MEDLINE

## (undated) DEVICE — SUTURE STRATAFIX SYMMETRIC SZ 1 L18IN ABSRB VLT CT1 L36CM SXPP1A404

## (undated) DEVICE — PAD,NON-ADHERENT,3X8,STERILE,LF,1/PK: Brand: MEDLINE

## (undated) DEVICE — SUTURE VICRYL PLUS TAPERPOINT ANTIBAC BRD SH NDL 3-0

## (undated) DEVICE — SUTURE ETHLN SZ 3-0 L18IN NONABSORBABLE BLK FS-1 L24MM 3/8 663H

## (undated) DEVICE — ELECTRO LUBE IS A SINGLE PATIENT USE DEVICE THAT IS INTENDED TO BE USED ON ELECTROSURGICAL ELECTRODES TO REDUCE STICKING.: Brand: KEY SURGICAL ELECTRO LUBE

## (undated) DEVICE — ARM DRAPE

## (undated) DEVICE — SPONGE LAP W18XL18IN WHT COT 4 PLY FLD STRUNG RADPQ DISP ST 2 PER PACK

## (undated) DEVICE — SUTURE VCRL + SZ 3-0 L27IN ABSRB UD L26MM SH 1/2 CIR VCP416H

## (undated) DEVICE — INSUFFLATION NEEDLE TO ESTABLISH PNEUMOPERITONEUM.: Brand: INSUFFLATION NEEDLE

## (undated) DEVICE — LIQUIBAND RAPID ADHESIVE 36/CS 0.8ML: Brand: MEDLINE